# Patient Record
Sex: FEMALE | Race: WHITE | NOT HISPANIC OR LATINO | Employment: OTHER | URBAN - METROPOLITAN AREA
[De-identification: names, ages, dates, MRNs, and addresses within clinical notes are randomized per-mention and may not be internally consistent; named-entity substitution may affect disease eponyms.]

---

## 2018-04-10 ENCOUNTER — TELEPHONE (OUTPATIENT)
Dept: INTERNAL MEDICINE | Facility: CLINIC | Age: 63
End: 2018-04-10

## 2018-04-11 NOTE — TELEPHONE ENCOUNTER
Called patient and left a message. Recommend new shingles vaccine which is not live. It is more effective than the older vaccine and is not live.  I am not clear from the message if this is info she is looking for . I do not have documentation that the older vaccine was given.  JH

## 2018-04-12 ENCOUNTER — TELEPHONE (OUTPATIENT)
Dept: INTERNAL MEDICINE | Facility: CLINIC | Age: 63
End: 2018-04-12

## 2018-04-12 NOTE — TELEPHONE ENCOUNTER
Called patient .Jacinta Patricia was the oncologist incvolved in patients care in 1998 when she underwent a BMT. Patient.  requests I call them to discuss the new shingles vaccine and ask why they recommend the old vaccine and not the new one.  I will look up info and place call.

## 2018-04-12 NOTE — TELEPHONE ENCOUNTER
Spoke with Dr. HERNANDEZ's office. They do recommend Shingrix at this time for patients with h/o BMT, not a live vaccine like zostavax. Called patient and left a message on her VM.  ARLEY

## 2018-11-05 ENCOUNTER — TELEPHONE (OUTPATIENT)
Dept: INTERNAL MEDICINE | Facility: CLINIC | Age: 63
End: 2018-11-05

## 2018-11-05 NOTE — TELEPHONE ENCOUNTER
Pt is asking for a return call from Dr Park  In regards to a question about the Shingles injection

## 2018-11-05 NOTE — TELEPHONE ENCOUNTER
called patient and left a detail message regarding her vaccine. Asked her to check with her insurance company re:coverage.Explained it is not live, and it is a series of 2 shots  by 2-6 months.   ARLEY

## 2019-01-17 RX ORDER — VIT C/E/ZN/COPPR/LUTEIN/ZEAXAN 250MG-90MG
1 CAPSULE ORAL DAILY
COMMUNITY
End: 2022-08-25

## 2019-01-17 RX ORDER — AMITRIPTYLINE HYDROCHLORIDE 25 MG/1
25 TABLET, FILM COATED ORAL NIGHTLY
COMMUNITY
Start: 2017-03-27 | End: 2019-01-21 | Stop reason: SDUPTHER

## 2019-01-21 ENCOUNTER — TELEPHONE (OUTPATIENT)
Dept: GYNECOLOGY | Facility: CLINIC | Age: 64
End: 2019-01-21

## 2019-01-21 RX ORDER — AMITRIPTYLINE HYDROCHLORIDE 25 MG/1
25 TABLET, FILM COATED ORAL NIGHTLY
Qty: 90 TABLET | Refills: 2 | Status: SHIPPED | OUTPATIENT
Start: 2019-01-21 | End: 2019-10-23 | Stop reason: SDUPTHER

## 2019-01-23 ENCOUNTER — OFFICE VISIT (OUTPATIENT)
Dept: INTERNAL MEDICINE | Facility: CLINIC | Age: 64
End: 2019-01-23
Payer: MEDICARE

## 2019-01-23 VITALS
HEART RATE: 76 BPM | TEMPERATURE: 97.9 F | BODY MASS INDEX: 22.16 KG/M2 | WEIGHT: 146.2 LBS | HEIGHT: 68 IN | RESPIRATION RATE: 12 BRPM | SYSTOLIC BLOOD PRESSURE: 138 MMHG | OXYGEN SATURATION: 97 % | DIASTOLIC BLOOD PRESSURE: 80 MMHG

## 2019-01-23 DIAGNOSIS — Z11.59 NEED FOR HEPATITIS C SCREENING TEST: ICD-10-CM

## 2019-01-23 DIAGNOSIS — Z23 NEED FOR VACCINATION WITH 13-POLYVALENT PNEUMOCOCCAL CONJUGATE VACCINE: ICD-10-CM

## 2019-01-23 DIAGNOSIS — Z78.0 ASYMPTOMATIC POSTMENOPAUSAL ESTROGEN DEFICIENCY: ICD-10-CM

## 2019-01-23 DIAGNOSIS — Z00.00 INITIAL MEDICARE ANNUAL WELLNESS VISIT: Primary | ICD-10-CM

## 2019-01-23 DIAGNOSIS — Z12.39 BREAST SCREENING: ICD-10-CM

## 2019-01-23 DIAGNOSIS — Z13.220 LIPID SCREENING: ICD-10-CM

## 2019-01-23 LAB
CHOLEST SERPL-MCNC: 195 MG/DL
HDLC SERPL-MCNC: 52 MG/DL
HDLC SERPL: 3.8 {RATIO}
LDLC SERPL CALC-MCNC: 116 MG/DL
NONHDLC SERPL-MCNC: 143 MG/DL
TRIGL SERPL-MCNC: 136 MG/DL (ref 30–149)

## 2019-01-23 PROCEDURE — 80061 LIPID PANEL: CPT | Mod: GZ | Performed by: INTERNAL MEDICINE

## 2019-01-23 PROCEDURE — 200200 PR NO CHARGE: Performed by: INTERNAL MEDICINE

## 2019-01-23 PROCEDURE — 36415 COLL VENOUS BLD VENIPUNCTURE: CPT | Performed by: INTERNAL MEDICINE

## 2019-01-23 PROCEDURE — G0403 EKG FOR INITIAL PREVENT EXAM: HCPCS | Performed by: INTERNAL MEDICINE

## 2019-01-23 PROCEDURE — G0009 ADMIN PNEUMOCOCCAL VACCINE: HCPCS | Performed by: INTERNAL MEDICINE

## 2019-01-23 PROCEDURE — 86803 HEPATITIS C AB TEST: CPT | Performed by: INTERNAL MEDICINE

## 2019-01-23 PROCEDURE — 90670 PCV13 VACCINE IM: CPT | Performed by: INTERNAL MEDICINE

## 2019-01-23 PROCEDURE — G0402 INITIAL PREVENTIVE EXAM: HCPCS | Performed by: INTERNAL MEDICINE

## 2019-01-23 ASSESSMENT — VISUAL ACUITY
OD_CC: 20/50
OS_CC: 20/50

## 2019-01-23 ASSESSMENT — MINI COG
TOTAL SCORE: 5
COMPLETED: YES

## 2019-01-23 NOTE — ASSESSMENT & PLAN NOTE
You received prevnar 13 today. Next year I will give you a pneumovax.  I also recommend shingrix at your local pharmacy, this is 2 doses 2-6 months part.

## 2019-01-23 NOTE — PROGRESS NOTES
"        Subjective     Melisa Wu is a 63 y.o. female who presents for an initial preventive physical exam.     Comprehensive Medical and Social History  There is no problem list on file for this patient.    Past Medical History:   Diagnosis Date   • AML (acute myeloblastic leukemia) (CMS/HCC) (HCC) 1998   • B12 deficiency    • Colon polyp 11/2017   • Insomnia    • Leukopenia    • Squamous cell carcinoma     Right arm   • Thyroid nodule 07/2013    2mm nodule     Past Surgical History:   Procedure Laterality Date   • BONE MARROW TRANSPLANT  1998     No Known Allergies  Current Outpatient Prescriptions   Medication Sig Dispense Refill   • amitriptyline (ELAVIL) 25 mg tablet Take 1 tablet (25 mg total) by mouth nightly. 90 tablet 2   • ascorbate calcium (VITAMIN C ORAL) Take 1 tablet by mouth daily.     • BIOTIN ORAL Take 1 tablet by mouth daily.     • cholecalciferol, vitamin D3, (VITAMIN D3) 1,000 unit capsule Take 1 capsule by mouth daily.       No current facility-administered medications for this visit.      Social History     Social History   • Marital status:      Spouse name: N/A   • Number of children: N/A   • Years of education: N/A     Social History Main Topics   • Smoking status: Former Smoker   • Smokeless tobacco: Never Used      Comment: quit 40+ years ago   • Alcohol use No   • Drug use: Unknown   • Sexual activity: Not on file     Other Topics Concern   • Not on file     Social History Narrative    Tobacco: Former smoker quit 40+ years ago, Alcohol: None, Occupation: Retired Exercise 3-4 times weekly        Objective   Vitals  Vitals:    01/23/19 1236   Pulse: 76   Resp: 12   Temp: 36.6 °C (97.9 °F)   TempSrc: Oral   SpO2: 97%   Weight: 66.3 kg (146 lb 3.2 oz)   Height: 1.727 m (5' 8\")     Body mass index is 22.23 kg/m².    Advanced Care Plan  Does patient have advance directive?: No                                     PHQ  Over the Past 2 Weeks, Have You Felt Down, Depressed or " Hopeless?: no  Over the Past 2 Weeks, Have You Felt Little Interest or Pleasure In Doing Things?: no                                          Mini Cog  Completed: Yes  Score: 5  Result: Negative    Get Up and Go  Result: Pass      STEADI Falls Risk  One or more falls in the last year: No           Has trouble stepping up onto a curb: No   Advised to use a cane or walker to get around safely: No   Often has to rush to the toilet: No   Feels unsteady when walking: No   Has lost some feeling in feet: No   Often feels sad or depressed: No   Steadies self on furniture while walking at home: No   Takes medication that makes him/her feel lightheaded or more tired than usual: No   Worried about falling: No   Takes medicine to sleep or improve mood: No   Needs to push with hands when rising from a chair: No   Falls screen completed: Yes     Hearing and Vision Screening   Visual Acuity Screening    Right eye Left eye Both eyes   Without correction:      With correction: 20/50 20/50 20/30         Assessment/Plan   Problem List Items Addressed This Visit     None              See Patient Instructions (the written plan) which was given to the patient for PPPS and health risk factors with interventions.

## 2019-01-23 NOTE — ASSESSMENT & PLAN NOTE
Please exercise at least 45 minutes 4 days a week.  Walking briskly is a great source of exercise.  The goal is for 150 minutes of aerobic exercise each week and 20 minutes of light weights twice a week.  Please receive a flu shot annually, ideally in October or November. See the dentist twice a year for routine cleaning.  Follow a low-cholesterol diet to lessen the risk of  heart disease.   Consume calcium and vitamin D in your diet as outlined by the sheet that I have given to you.  If necessary, supplement with vitamin D3 1000 international units 4-5 days a week especially  in the winter months.  Limit alcohol beverages to 5-7 a week.    Check your Bp at home on occasion to help reasure yourself that BP is fine to lessen white coat hypertension when you are here.

## 2019-01-23 NOTE — PROGRESS NOTES
HISTORY OF PRESENT ILLNESS      Melisa Wu is a 63 y.o. year-old female.  Feeling fine. Reviewed medicare packet.  is POA and is a .   White coat HTN at times.  No complaints.  Discussed making a living will and does have a POA.       PAST MEDICAL AND SURGICAL HISTORY      PMHx:  Past Medical History:   Diagnosis Date   • AML (acute myeloblastic leukemia) (CMS/HCC) (HCC) 1998   • B12 deficiency    • Colon polyp 11/2017   • Insomnia    • Leukopenia    • Squamous cell carcinoma     Right arm   • Thyroid nodule 07/2013    2mm nodule       PSHx:  Past Surgical History:   Procedure Laterality Date   • BONE MARROW TRANSPLANT  1998       MEDICATIONS        Current Outpatient Prescriptions:   •  amitriptyline (ELAVIL) 25 mg tablet, Take 1 tablet (25 mg total) by mouth nightly., Disp: 90 tablet, Rfl: 2  •  ascorbate calcium (VITAMIN C ORAL), Take 1 tablet by mouth daily., Disp: , Rfl:   •  BIOTIN ORAL, Take 1 tablet by mouth daily., Disp: , Rfl:   •  cholecalciferol, vitamin D3, (VITAMIN D3) 1,000 unit capsule, Take 1 capsule by mouth daily., Disp: , Rfl:     ALLERGIES      Patient has no known allergies.    FAMILY HISTORY      Family History   Problem Relation Age of Onset   • Other Father         Vascular   • Breast cancer Maternal Grandmother    • Cancer Maternal Grandfather    • Colon cancer Paternal Grandmother    • Prostate cancer Paternal Grandfather        SOCIAL HISTORY      Social History     Social History   • Marital status:      Spouse name: N/A   • Number of children: N/A   • Years of education: N/A     Social History Main Topics   • Smoking status: Former Smoker   • Smokeless tobacco: Never Used      Comment: quit 40+ years ago   • Alcohol use No   • Drug use: Unknown   • Sexual activity: Not on file     Other Topics Concern   • Not on file     Social History Narrative    Tobacco: Former smoker quit 40+ years ago, Alcohol: None, Occupation: Retired Exercise 3-4 times weekly   "      REVIEW OF SYSTEMS      Review of Systems   All other systems reviewed and are negative.      PHYSICAL EXAMINATION      /80   Pulse 76   Temp 36.6 °C (97.9 °F) (Oral)   Resp 12   Ht 1.727 m (5' 8\")   Wt 66.3 kg (146 lb 3.2 oz)   SpO2 97%   BMI 22.23 kg/m²   Body mass index is 22.23 kg/m².    Physical Exam   Constitutional: She is oriented to person, place, and time. She appears well-developed and well-nourished.   HENT:   Head: Normocephalic and atraumatic.   Eyes: Conjunctivae and EOM are normal. Pupils are equal, round, and reactive to light.   Neck: Normal range of motion. Neck supple. No JVD present. No tracheal deviation present. No thyromegaly present.   Cardiovascular: Normal rate, regular rhythm, normal heart sounds and intact distal pulses.    Pulmonary/Chest: Effort normal and breath sounds normal. No respiratory distress. She has no wheezes.   Abdominal: Soft. Bowel sounds are normal. She exhibits no distension. There is no tenderness.   Musculoskeletal: Normal range of motion. She exhibits no edema.   Neurological: She is alert and oriented to person, place, and time. No cranial nerve deficit.   Skin: Skin is warm and dry.   Psychiatric: She has a normal mood and affect. Her behavior is normal. Judgment and thought content normal.   Vitals reviewed.           ASSESSMENT AND PLAN   Problem List Items Addressed This Visit     Initial Medicare annual wellness visit - Primary    Relevant Orders    ECG 12 LEAD OFFICE PERFORMED    Lipid panel    Asymptomatic postmenopausal estrogen deficiency    Relevant Orders    DEXA BONE DENSITY    Lipid screening    Relevant Orders    Lipid panel    Breast screening    Relevant Orders    BI SCREENING MAMMOGRAM BILATERAL    Need for hepatitis C screening test    Relevant Orders    Hepatitis C antibody    Need for vaccination with 13-polyvalent pneumococcal conjugate vaccine             Written education and action steps suggested to the patient are " documented in After Visit Summary / Patient Instructions sections of this encounter.

## 2019-01-23 NOTE — PATIENT INSTRUCTIONS
Women's Personalized Prevention Plan Services (PPPS)      Preventive Services Checklist (Assumes Average Risk Unless Otherwise Noted)  Preventive Service Target Population and Frequency Last Done Date Due   Abd Aortic Aneurysm Screening Family history of AAA (covered once) N/A    Alcohol Misuse Screening As necessary for those at risk (covered annually) N/A    Breast Cancer Screening Mammogram every 1-2yrs 50-71yo; every 1-2yrs 35-48yo if patient desires after weighing potential harms and benefits (covered once 35-38yo, annually >=39yo) 01/25/17    Cholesterol Screening Both risk factors: 1) >=19yo and 2)  increased risk coronary artery disease (covered every 5 years) 10/18/16    Colorectal Cancer Screening >=49yo: Colonoscopy every 10 years, FIT annually or Cologuard every 3 years (up to 84yo for Cologuard) 11/29/17    Diabetes Screening Any 1 risk factor: hypertension, dyslipidemia, obesity, high glucose; or Any 2 risk factors: >=64 yo, overweight, family history diabetes, history gestational diabetes or delivery of infant >9lbs (covered every 6 months) 10/18/16    Glaucoma Screening Any 1 risk factor: 1) diabetes, 2) family history glaucoma, 3)  >=49yo, 4)  American >=66yo (covered annually) 2017    Hepatitis C Screening Any 1 risk factor: 1) born between 4432-9689, 2) blood transfusion before 1992, 3) current or past injection drug use (covered once for average risk, annually for high risk)     Lung Cancer Screening Low-dose chest CT if all 3 risk factors: 1) 55-78yo, 2) smoker or quit within last 15y, 3) >=30 pack years (covered annually) N/A    Osteoporosis Screening >=66yo (covered every 24 months)  <66yo if any 1 risk factor: 1) estrogen deficient and at risk for osteoporosis; 2) established osteoporosis on treatment; 3) x-rays show possible osteoporosis, osteopenia or vertebral fractures; 4) on steroid or planning to start; 5) primary hyperparathyroidism  "(covered as medically necessary)     Sexually Transmitted Diseases (STDs) As necessary chlamydia, gonorrhea, syphilis, hepatitis B (covered annually if not pregnant, more often in pregnancy)  HIV if any 1 risk factor present: 1) <16yo or >66yo and at increased risk, 2) 15-66yo and ask for it, or 3) pregnant (covered annually if not pregnant, up to 3x in pregnancy) N/A    Vaccine: Hepatitis B As necessary if medium or high risk (series covered once)     Vaccine: Influenza All patients without contraindications (covered annually.) Oct. 2018    Vaccine: Pneumococcal Pneumovax + Prevnar (both covered, >=11 months apart) Pneumovax 05/15/2015    Vaccine: Shingrix (Shingles) >= 51yo without contraindications             (2 doses, 2 months apart) Shingrix 12/28/18    Other Services               Women's Personalized Prevention Plan Services (PPPS)              Prints to S                                         Health Risk Factors and Interventions  \"x\" Indicates risk is associated with this patient Risk Factor Recommended Interventions     Obesity     Hypertension     Diabetes     Fall Risk     Tobacco Use     Other:        Problem List Items Addressed This Visit     Initial Medicare annual wellness visit - Primary    Current Assessment & Plan     Please exercise at least 45 minutes 4 days a week.  Walking briskly is a great source of exercise.  The goal is for 150 minutes of aerobic exercise each week and 20 minutes of light weights twice a week.  Please receive a flu shot annually, ideally in October or November. See the dentist twice a year for routine cleaning.  Follow a low-cholesterol diet to lessen the risk of  heart disease.   Consume calcium and vitamin D in your diet as outlined by the sheet that I have given to you.  If necessary, supplement with vitamin D3 1000 international units 4-5 days a week especially  in the winter months.  Limit alcohol beverages to 5-7 a week.    Check your Bp at home on occasion to " help reasure yourself that BP is fine to lessen white coat hypertension when you are here.          Relevant Orders    ECG 12 LEAD OFFICE PERFORMED    Lipid panel    Asymptomatic postmenopausal estrogen deficiency    Current Assessment & Plan     Schedule a dexa scan          Relevant Orders    DEXA BONE DENSITY    Lipid screening    Current Assessment & Plan     I will order a lipid panel today since you had a light breakfast and not eaten for 2 hours.          Relevant Orders    Lipid panel    Breast screening    Current Assessment & Plan     Schedule a mammogram          Relevant Orders    BI SCREENING MAMMOGRAM BILATERAL    Need for hepatitis C screening test    Current Assessment & Plan     I will screen for hep c today.          Relevant Orders    Hepatitis C antibody    Need for vaccination with 13-polyvalent pneumococcal conjugate vaccine    Current Assessment & Plan     You received prevnar 13 today. Next year I will give you a pneumovax.  I also recommend shingrix at your local pharmacy, this is 2 doses 2-6 months part.

## 2019-01-24 LAB — HCV AB SER QL: NONREACTIVE

## 2019-06-04 ENCOUNTER — OFFICE VISIT (OUTPATIENT)
Dept: GYNECOLOGY | Facility: CLINIC | Age: 64
End: 2019-06-04
Payer: MEDICARE

## 2019-06-04 VITALS
WEIGHT: 143.2 LBS | BODY MASS INDEX: 21.7 KG/M2 | HEIGHT: 68 IN | DIASTOLIC BLOOD PRESSURE: 98 MMHG | SYSTOLIC BLOOD PRESSURE: 140 MMHG

## 2019-06-04 DIAGNOSIS — Z01.419 ENCOUNTER FOR GYNECOLOGICAL EXAMINATION WITHOUT ABNORMAL FINDING: Primary | ICD-10-CM

## 2019-06-04 DIAGNOSIS — Z87.59 HISTORY OF GESTATIONAL HYPERTENSION: ICD-10-CM

## 2019-06-04 DIAGNOSIS — Z80.3 FAMILY HISTORY OF BREAST CANCER: ICD-10-CM

## 2019-06-04 PROCEDURE — G0101 CA SCREEN;PELVIC/BREAST EXAM: HCPCS | Performed by: OBSTETRICS & GYNECOLOGY

## 2019-06-04 RX ORDER — PNV NO.95/FERROUS FUM/FOLIC AC 28MG-0.8MG
100 TABLET ORAL DAILY
COMMUNITY
End: 2024-06-06

## 2019-06-04 NOTE — PROGRESS NOTES
"Chief Complaint:  Annual    No complaints.  Nl mammo 2019  DEXA osteopenia low frax 2019  Pap neg/neg 2015  Rare sexual activity, same male partner.  Vulvodynia controlled with elavil    HPI:  2019      Melisa Wu is a 63 y.o. female who presents for annual exam. The patient has no complaints today. The patient is sexually active. GYN screening history: last pap: was normal. The patient has never been taking hormone replacement therapy. Patient denies post-menopausal vaginal bleeding.. The patient participates in regular exercise: yes.  The patient reports that there is not domestic violence in her life.    History of abnormal Pap smear: no  Family history of uterine or ovarian cancer: no  History of abnormal mammogram: no  Family history of breast cancer: yes - mat gm, mat aunt    OB History: Menstrual History:  OB History      Para Term  AB Living    1 1 1     1    SAB TAB Ectopic Multiple Live Births                     Obstetric Comments    + hbp, \"borderline\" diabetes         No LMP recorded. Patient is postmenopausal.         Medical History:   Past Medical History:   Diagnosis Date   • AML (acute myeloblastic leukemia) (CMS/HCC) (HCC)    • B12 deficiency    • Colon polyp 2017   • Insomnia    • Leukopenia    • Squamous cell carcinoma     Right arm   • Thyroid nodule 2013    2mm nodule       Surgical History:   Past Surgical History:   Procedure Laterality Date   • BONE MARROW TRANSPLANT         Social History:   Social History     Social History   • Marital status:      Spouse name: N/A   • Number of children: N/A   • Years of education: N/A     Social History Main Topics   • Smoking status: Former Smoker   • Smokeless tobacco: Never Used      Comment: quit 40+ years ago   • Alcohol use No   • Drug use: No   • Sexual activity: Yes     Partners: Male     Other Topics Concern   • None     Social History Narrative    Tobacco: Former smoker quit 40+ years ago, Alcohol: " "None, Occupation: Retired Exercise 3-4 times weekly        Family History:   Family History   Problem Relation Age of Onset   • Other Father         Vascular   • Breast cancer Maternal Grandmother    • Cancer Maternal Grandfather    • Colon cancer Paternal Grandmother    • Prostate cancer Paternal Grandfather        Current Medications:   Current Outpatient Prescriptions   Medication Sig Dispense Refill   • amitriptyline (ELAVIL) 25 mg tablet Take 1 tablet (25 mg total) by mouth nightly. 90 tablet 2   • ascorbate calcium (VITAMIN C ORAL) Take 1 tablet by mouth daily.     • BIOTIN ORAL Take 1 tablet by mouth daily.     • cholecalciferol, vitamin D3, (VITAMIN D3) 1,000 unit capsule Take 1 capsule by mouth daily.     • cyanocobalamin (vitamin B-12) 100 mcg tablet Take 100 mcg by mouth daily.       No current facility-administered medications for this visit.        Allergies: Patient has no known allergies.    Review of Systems  Constitutional: negative for weight loss  Gastrointestinal: negative for abdominal pain, oncont  Genitourinary:negative for urinary incontinence  Reproductive:no vaginal bleeding  Integument/breast: negative for breast lump, breast tenderness and nipple discharge  Musculoskeletal:negative  Neurological: negative  Behavioral/Psych: negative for anxiety and depression  Endocrine: negative    Physical Exam  BP (!) 140/98   Ht 1.727 m (5' 8\")   Wt 65 kg (143 lb 3.2 oz)   BMI 21.77 kg/m²      General Appearance: Alert, cooperative, no acute distress  Head: Normocephalic, without obvious abnormality, atraumatic    Neck: no adenopathy  Nodes: no enlargement of axillary or supraclavicular nodes  Thyroid: no enlargement/tenderness/nodules  Lungs: Clear to auscultation bilaterally, respirations unlabored  Heart: Regular rate and rhythm, S1 and S2 normal, no murmur, rub or gallop  Breast: no masses, nontender and no discharge  Abdomen: Soft, nontender, nondistended,   no masses, no organomegaly  Back: " kyphosisNo   Pelvic:     Vulva normal, Bartholin's, Urethra, Camano's normal  Vagina pale  Cervixnulliparous appearance  Uterusnormal size, anteverted, mobile  Adnexa normal adnexa and no mass, fullness, tenderness  Rectal: mass No   Extremities: wnl  Musculoskeletal:wnl  Skin: Skin color, texture, turgor normal, no rashes or lesions    Neurologic:oriented and  memory intact  Psych:normal affect and behavior appropriate    Assessment & Plan    Problem List Items Addressed This Visit     Family history of breast cancer    Overview     Mat aunt and mat gm. unknown if they had any mutations.    Needs to have tyrer audrey 8 done was less than 20 uin old model    With new tyrer risk is 13%         History of gestational hypertension    Overview     Risk for early cvd  To see cardiology           Other Visit Diagnoses     Encounter for gynecological examination without abnormal finding    -  Primary          Return in about 1 year (around 6/4/2020).  Fabiana Seay MD

## 2019-06-06 ENCOUNTER — TELEPHONE (OUTPATIENT)
Dept: SCHEDULING | Facility: CLINIC | Age: 64
End: 2019-06-06

## 2019-06-06 NOTE — TELEPHONE ENCOUNTER
Patient has appnt 7/18 for new pat appnt. If there is anything sooner,please call patient at [phone]. ty

## 2019-06-18 ENCOUNTER — OFFICE VISIT (OUTPATIENT)
Dept: CARDIOLOGY | Facility: CLINIC | Age: 64
End: 2019-06-18
Payer: MEDICARE

## 2019-06-18 VITALS
WEIGHT: 146.9 LBS | DIASTOLIC BLOOD PRESSURE: 90 MMHG | OXYGEN SATURATION: 96 % | BODY MASS INDEX: 22.26 KG/M2 | HEART RATE: 90 BPM | SYSTOLIC BLOOD PRESSURE: 140 MMHG | HEIGHT: 68 IN

## 2019-06-18 DIAGNOSIS — R03.0 ELEVATED BLOOD PRESSURE READING WITHOUT DIAGNOSIS OF HYPERTENSION: ICD-10-CM

## 2019-06-18 DIAGNOSIS — Z91.89 CARDIOVASCULAR RISK FACTOR: Primary | ICD-10-CM

## 2019-06-18 PROCEDURE — 99204 OFFICE O/P NEW MOD 45 MIN: CPT | Performed by: INTERNAL MEDICINE

## 2019-06-18 NOTE — LETTER
"2019     Rocky Park MD  [address]  4th Floor  [facility] [address]    Patient: Melisa Wu  YOB: 1955  Date of Visit: 2019      Dear Dr. Park:    Thank you for referring Melisa Wu to me for evaluation. Below are my notes for this consultation.    If you have questions, please do not hesitate to call me. I look forward to following your patient along with you.         Sincerely,        Saritha Nick MD        CC: No Recipients  Saritha Nick MD  2019  6:37 AM  Sign at close encounter   Saritha Nick MD, [facility]  Cardiology    [facility]  The [facility] Level  [address] [place] [address]    TEL  [phone]  [facility]/ml     2019    Reason for visit: Cardiovascular Consultation.    Melisa Wu is a 63 y.o. female who presents for cardiovascular consultation.  History is obtained from the patient and extensive review of all available medical records.    Melisa has a notable pregnancy history, complicated by gestational hypertension and \"borderline diabetes.\" Blood pressure is elevated on exam today and Melisa reports it has been at periodic physician visits.  On review of all available medical records, blood pressure has been mildly elevated measuring 140/98 mmHg on 2019, 138/80 mmHg on 2019.    She also had premature menopause, completing around age 43.    Kelin is without cardiovascular symptom or concern.  She reports regular exercise, participating in yoga and marianela chi as well as weight training regularly without cardia vascular symptom or limitation.  She denies chest pain, shortness of breath, orthopnea, PND, palpitations, dizziness, lightheadedness, or syncope.    Past Medical History:  1.  Acute myoblastic leukemia:   2.  History of gestational hypertension  3.  Premature menopause  4.  Obstetric history:   5.  Leukopenia  6.  " Insomnia    Past Surgical History:  1.  Bone marrow transplant: 1998  2.  Port placement: 1998    Medications: Amitriptyline 25 mg nightly, vitamin B12 100 mcg daily, vitamin D 1000 units daily, biotin, vitamin C.    Allergies: Patient has no known allergies.    Social History: , Juan Alberto. Former smoker, quit over 40 years ago.  Denies alcohol or illicit drug use.    Family History: Reviewed and noncontributory.    Review of Systems   Constitution: Negative for malaise/fatigue and weight gain.   HENT: Negative for nosebleeds.    Eyes: Negative for vision loss in left eye, vision loss in right eye and visual disturbance.   Cardiovascular: Negative for chest pain, cyanosis, dyspnea on exertion, irregular heartbeat, leg swelling, near-syncope, orthopnea, palpitations, paroxysmal nocturnal dyspnea and syncope.   Respiratory: Negative for cough, shortness of breath, snoring and wheezing.    Endocrine: Negative for polyuria.   Hematologic/Lymphatic: Negative for bleeding problem. Does not bruise/bleed easily.   Skin: Negative for rash.   Musculoskeletal: Negative for arthritis and myalgias.   Gastrointestinal: Negative for abdominal pain, hematochezia, melena, nausea and vomiting.   Genitourinary: Negative for hematuria.   Neurological: Negative for dizziness and light-headedness.   Psychiatric/Behavioral: Negative for altered mental status and depression.       Exam:  Objective   Vitals:    06/18/19 1117   BP: 140/90   Pulse: 90   SpO2: 96%     Body mass index is 22.34 kg/m².  Physical Exam   Constitutional: She appears well-developed and well-nourished. No distress.   HENT:   Head: Normocephalic.   Mouth/Throat: Mucous membranes are normal. Mucous membranes are not cyanotic.   Eyes: Conjunctivae are normal.   Neck: No JVD present. Carotid bruit is not present.   Cardiovascular: Normal rate, regular rhythm, S1 normal and S2 normal.   No extrasystoles are present. Exam reveals no gallop.    No murmur  heard.  Pulses:       Carotid pulses are 2+ on the right side, and 2+ on the left side.       Radial pulses are 2+ on the right side, and 2+ on the left side.   Pulmonary/Chest: Effort normal. No respiratory distress. She has no wheezes. She has no rales.   Abdominal: Soft. Bowel sounds are normal. There is no tenderness.   Musculoskeletal: She exhibits no edema.   Lymphadenopathy:     She has no cervical adenopathy.   Neurological: She is alert.   Skin: Skin is warm and dry. No cyanosis.   Psychiatric: She has a normal mood and affect. Her speech is normal.       Labs:  Lab Results   Component Value Date    CHOL 195 01/23/2019    TRIG 136 01/23/2019    HDL 52 (L) 01/23/2019     Personally reviewed, my interpretation: .    Cardiovascular Studies:   1.  Echocardiogram 12/1/2016 (personally reviewed): Normal left ventricular cavity size and wall thickness with preserved systolic function, EF 60%.  No regional wall motion abnormalities.  False tendon.  Normal right ventricular size with preserved systolic function.  Normal biatrial size.  Mitral leaflets are mildly thickened.  Mild posterior annular calcification.  Mild mitral regurgitation.  Trileaflet aortic valve.  No aortic stenosis or regurgitation.  Mild tricuspid regurgitation.  Estimated RVSP 18 mmHg.    ECG from 1/23/19 personally reviewed and discussed with the patient shows sinus rhythm, normal tracing.      Assessment/Plan   Problem List Items Addressed This Visit     Cardiovascular risk factor - Primary     Melisa has a history of gestational hypertension and premature menopause.  Discussed increased risk of hypertension and cardiovascular disease in women with hypertensive complications of pregnancy as well as premature menopause.   Recent lipids from 1/23/2019 reviewed and show acceptable levels with LDL measuring 116.  --Blood pressure management as below.  --Lifestyle modification discussed.  She maintains a healthy weights with heart healthy  diet.  I have recommended increasing regular aerobic activity.  --Consider coronary calcium score.         Relevant Orders    ECG 12 LEAD-OFFICE PERFORMED    Elevated blood pressure reading without diagnosis of hypertension     Blood pressure elevated on exam today, measuring 140/90 mmHg in her left upper extremity and 138/90 mmHg in her right.  She reports whitecoat hypertension.  She does have a home blood pressure cuff, which she did bring today however unfortunately the power cord is missing.  She does not monitor home blood pressure regularly.  --Recommend she obtain a home blood pressure cuff and log readings 3-4 times per week.  Bring home blood pressure cuff to next visit.  --If home blood pressures are all well within normal limits and home blood pressure cuff correlates, we can hold on antihypertensive therapy.  Otherwise I would recommend initiation of antihypertensive therapy.  --Lifestyle modification discussed.  --Return in 4 weeks for blood pressure check.                  This letter was generated using speech recognition software. Please excuse any typographical errors.    Return in about 4 weeks (around 7/16/2019).        Saritha Nick MD, FACC

## 2019-06-18 NOTE — PROGRESS NOTES
" Saritha Nick MD, [facility]  Cardiology    [facility]    [facility]  [facility] [address]  [address] [place] [address]    TEL  [phone]  [facility]     2019    Reason for visit: Cardiovascular Consultation.    Melisa uW is a 63 y.o. female who presents for cardiovascular consultation.  History is obtained from the patient and extensive review of all available medical records.    Melisa has a notable pregnancy history, complicated by gestational hypertension and \"borderline diabetes.\" Blood pressure is elevated on exam today and Melisa reports it has been at periodic physician visits.  On review of all available medical records, blood pressure has been mildly elevated measuring 140/98 mmHg on 2019, 138/80 mmHg on 2019.    She also had premature menopause, completing around age 43.    Kelin is without cardiovascular symptom or concern.  She reports regular exercise, participating in yoga and marianela chi as well as weight training regularly without cardia vascular symptom or limitation.  She denies chest pain, shortness of breath, orthopnea, PND, palpitations, dizziness, lightheadedness, or syncope.    Past Medical History:  1.  Acute myoblastic leukemia:   2.  History of gestational hypertension  3.  Premature menopause  4.  Obstetric history:   5.  Leukopenia  6.  Insomnia    Past Surgical History:  1.  Bone marrow transplant:   2.  Port placement:     Medications: Amitriptyline 25 mg nightly, vitamin B12 100 mcg daily, vitamin D 1000 units daily, biotin, vitamin C.    Allergies: Patient has no known allergies.    Social History: , Juan Alberto. Former smoker, quit over 40 years ago.  Denies alcohol or illicit drug use.    Family History: Reviewed and noncontributory.    Review of Systems   Constitution: Negative for malaise/fatigue and weight gain.   HENT: Negative for nosebleeds.    Eyes: Negative " for vision loss in left eye, vision loss in right eye and visual disturbance.   Cardiovascular: Negative for chest pain, cyanosis, dyspnea on exertion, irregular heartbeat, leg swelling, near-syncope, orthopnea, palpitations, paroxysmal nocturnal dyspnea and syncope.   Respiratory: Negative for cough, shortness of breath, snoring and wheezing.    Endocrine: Negative for polyuria.   Hematologic/Lymphatic: Negative for bleeding problem. Does not bruise/bleed easily.   Skin: Negative for rash.   Musculoskeletal: Negative for arthritis and myalgias.   Gastrointestinal: Negative for abdominal pain, hematochezia, melena, nausea and vomiting.   Genitourinary: Negative for hematuria.   Neurological: Negative for dizziness and light-headedness.   Psychiatric/Behavioral: Negative for altered mental status and depression.       Exam:  Objective   Vitals:    06/18/19 1117   BP: 140/90   Pulse: 90   SpO2: 96%     Body mass index is 22.34 kg/m².  Physical Exam   Constitutional: She appears well-developed and well-nourished. No distress.   HENT:   Head: Normocephalic.   Mouth/Throat: Mucous membranes are normal. Mucous membranes are not cyanotic.   Eyes: Conjunctivae are normal.   Neck: No JVD present. Carotid bruit is not present.   Cardiovascular: Normal rate, regular rhythm, S1 normal and S2 normal.   No extrasystoles are present. Exam reveals no gallop.    No murmur heard.  Pulses:       Carotid pulses are 2+ on the right side, and 2+ on the left side.       Radial pulses are 2+ on the right side, and 2+ on the left side.   Pulmonary/Chest: Effort normal. No respiratory distress. She has no wheezes. She has no rales.   Abdominal: Soft. Bowel sounds are normal. There is no tenderness.   Musculoskeletal: She exhibits no edema.   Lymphadenopathy:     She has no cervical adenopathy.   Neurological: She is alert.   Skin: Skin is warm and dry. No cyanosis.   Psychiatric: She has a normal mood and affect. Her speech is normal.        Labs:  Lab Results   Component Value Date    CHOL 195 01/23/2019    TRIG 136 01/23/2019    HDL 52 (L) 01/23/2019     Personally reviewed, my interpretation: .    Cardiovascular Studies:   1.  Echocardiogram 12/1/2016 (personally reviewed): Normal left ventricular cavity size and wall thickness with preserved systolic function, EF 60%.  No regional wall motion abnormalities.  False tendon.  Normal right ventricular size with preserved systolic function.  Normal biatrial size.  Mitral leaflets are mildly thickened.  Mild posterior annular calcification.  Mild mitral regurgitation.  Trileaflet aortic valve.  No aortic stenosis or regurgitation.  Mild tricuspid regurgitation.  Estimated RVSP 18 mmHg.    ECG from 1/23/19 personally reviewed and discussed with the patient shows sinus rhythm, normal tracing.      Assessment/Plan   Problem List Items Addressed This Visit     Cardiovascular risk factor - Primary     Melisa has a history of gestational hypertension and premature menopause.  Discussed increased risk of hypertension and cardiovascular disease in women with hypertensive complications of pregnancy as well as premature menopause.   Recent lipids from 1/23/2019 reviewed and show acceptable levels with LDL measuring 116.  --Blood pressure management as below.  --Lifestyle modification discussed.  She maintains a healthy weights with heart healthy diet.  I have recommended increasing regular aerobic activity.  --Consider coronary calcium score.         Relevant Orders    ECG 12 LEAD-OFFICE PERFORMED    Elevated blood pressure reading without diagnosis of hypertension     Blood pressure elevated on exam today, measuring 140/90 mmHg in her left upper extremity and 138/90 mmHg in her right.  She reports whitecoat hypertension.  She does have a home blood pressure cuff, which she did bring today however unfortunately the power cord is missing.  She does not monitor home blood pressure regularly.  --Recommend  she obtain a home blood pressure cuff and log readings 3-4 times per week.  Bring home blood pressure cuff to next visit.  --If home blood pressures are all well within normal limits and home blood pressure cuff correlates, we can hold on antihypertensive therapy.  Otherwise I would recommend initiation of antihypertensive therapy.  --Lifestyle modification discussed.  --Return in 4 weeks for blood pressure check.                  This letter was generated using speech recognition software. Please excuse any typographical errors.    Return in about 4 weeks (around 7/16/2019).        Saritha Nick MD, FACC

## 2019-06-19 ASSESSMENT — ENCOUNTER SYMPTOMS
ALTERED MENTAL STATUS: 0
SYNCOPE: 0
DEPRESSION: 0
LIGHT-HEADEDNESS: 0
RIGHT EYE: 0
DIZZINESS: 0
IRREGULAR HEARTBEAT: 0
HEMATOCHEZIA: 0
PND: 0
WHEEZING: 0
NEAR-SYNCOPE: 0
SHORTNESS OF BREATH: 0
HEMATURIA: 0
VOMITING: 0
SNORING: 0
LEFT EYE: 0
BRUISES/BLEEDS EASILY: 0
WEIGHT GAIN: 0
DYSPNEA ON EXERTION: 0
COUGH: 0
ABDOMINAL PAIN: 0
ORTHOPNEA: 0
NAUSEA: 0
MYALGIAS: 0
PALPITATIONS: 0

## 2019-06-19 NOTE — ASSESSMENT & PLAN NOTE
Melisa has a history of gestational hypertension and premature menopause.  Discussed increased risk of hypertension and cardiovascular disease in women with hypertensive complications of pregnancy as well as premature menopause.   Recent lipids from 1/23/2019 reviewed and show acceptable levels with LDL measuring 116.  --Blood pressure management as below.  --Lifestyle modification discussed.  She maintains a healthy weights with heart healthy diet.  I have recommended increasing regular aerobic activity.  --Consider coronary calcium score.

## 2019-06-19 NOTE — ASSESSMENT & PLAN NOTE
Blood pressure elevated on exam today, measuring 140/90 mmHg in her left upper extremity and 138/90 mmHg in her right.  She reports whitecoat hypertension.  She does have a home blood pressure cuff, which she did bring today however unfortunately the power cord is missing.  She does not monitor home blood pressure regularly.  --Recommend she obtain a home blood pressure cuff and log readings 3-4 times per week.  Bring home blood pressure cuff to next visit.  --If home blood pressures are all well within normal limits and home blood pressure cuff correlates, we can hold on antihypertensive therapy.  Otherwise I would recommend initiation of antihypertensive therapy.  --Lifestyle modification discussed.  --Return in 4 weeks for blood pressure check.

## 2019-07-16 ENCOUNTER — TELEPHONE (OUTPATIENT)
Dept: SCHEDULING | Facility: CLINIC | Age: 64
End: 2019-07-16

## 2019-07-16 NOTE — TELEPHONE ENCOUNTER
Pt needs to cancel appt on Friday and needs to reschedule.  Next appt is not until Sept, pt says she needs to see her sooner.  Pt can be reached at [phone].

## 2019-09-06 ENCOUNTER — OFFICE VISIT (OUTPATIENT)
Dept: CARDIOLOGY | Facility: CLINIC | Age: 64
End: 2019-09-06
Payer: MEDICARE

## 2019-09-06 VITALS
RESPIRATION RATE: 16 BRPM | OXYGEN SATURATION: 98 % | HEIGHT: 68 IN | WEIGHT: 148.3 LBS | SYSTOLIC BLOOD PRESSURE: 148 MMHG | HEART RATE: 76 BPM | BODY MASS INDEX: 22.48 KG/M2 | DIASTOLIC BLOOD PRESSURE: 94 MMHG

## 2019-09-06 DIAGNOSIS — I10 ESSENTIAL HYPERTENSION: ICD-10-CM

## 2019-09-06 DIAGNOSIS — Z87.59 HISTORY OF GESTATIONAL HYPERTENSION: ICD-10-CM

## 2019-09-06 DIAGNOSIS — Z91.89 CARDIOVASCULAR RISK FACTOR: Primary | ICD-10-CM

## 2019-09-06 PROCEDURE — 99213 OFFICE O/P EST LOW 20 MIN: CPT | Performed by: INTERNAL MEDICINE

## 2019-09-06 RX ORDER — AMLODIPINE BESYLATE 2.5 MG/1
2.5 TABLET ORAL DAILY
Qty: 90 TABLET | Refills: 1 | Status: SHIPPED | OUTPATIENT
Start: 2019-09-06 | End: 2019-11-19 | Stop reason: SDUPTHER

## 2019-09-06 ASSESSMENT — ENCOUNTER SYMPTOMS
MYALGIAS: 0
SHORTNESS OF BREATH: 0
ALTERED MENTAL STATUS: 0
RIGHT EYE: 0
PND: 0
VOMITING: 0
COUGH: 0
DYSPNEA ON EXERTION: 0
DIZZINESS: 0
SYNCOPE: 0
BRUISES/BLEEDS EASILY: 0
NEAR-SYNCOPE: 0
WEIGHT GAIN: 0
ABDOMINAL PAIN: 0
PALPITATIONS: 0
WHEEZING: 0
NAUSEA: 0
LEFT EYE: 0
SNORING: 0
DEPRESSION: 0
IRREGULAR HEARTBEAT: 0
HEMATOCHEZIA: 0
ORTHOPNEA: 0
HEMATURIA: 0
LIGHT-HEADEDNESS: 0

## 2019-09-06 NOTE — LETTER
September 7, 2019                                                                                                                                                                                                                                                                                                                   Patient: Melisa Wu   YOB: 1955   Date of Visit: 9/6/2019       Dear Dr. Park:    Thank you for the opportunity of seeing your patient, Melisa Wu, today, 9/6/2019. Following is a summary of today's visit and my recommendation(s).    Thank you for allowing us to participate in Melisa Hilario care.  Please feel free to contact me with any questions.      Assessment / Plan:  Problem List Items Addressed This Visit     History of gestational hypertension     Noted.  Now likely chronic hypertension as below.  --Blood pressure management as below.         Cardiovascular risk factor - Primary     Melisa has a history of gestational hypertension, now hypertension, former tobacco use (although quit over 40 years ago), and premature menopause.  Discussed increased risk of cardiovascular disease in women with hypertensive complications of pregnancy as well as premature menopause.   Recent lipids from 1/23/2019 reviewed and show acceptable levels with LDL measuring 116.  --Blood pressure management as below.  --Lifestyle modification discussed.  She maintains a healthy weight with heart healthy diet.  I have recommended increasing regular aerobic activity.  --Recommend coronary calcium score.  Reviewed risks and benefits including out-of-pocket cost and the patient agrees to proceed.         Relevant Orders    CT HEART CORONARY CALCIUM SCORE    Essential hypertension     Blood pressure elevated on exam today, measuring 148/94 mmHg.  Blood pressure has been elevated now at three separate physician visits, consistent with underlying essential hypertension.  Home blood pressure cuff has  also show mildly elevated readings, however her cuff measures 162/102 mmHg following my reading which is much higher.  --Recommend initiation of antihypertensive therapy.  --Start amlodipine 2.5 mg daily.  --Goal blood pressure less than 130/80 mmHg.  --Lifestyle modification discussed, particularly regular exercise.  --Return in 4-6 weeks for blood pressure check.         Relevant Medications    amLODIPine (NORVASC) 2.5 mg tablet                 Sincerely,      Saritha Nick MD    CC: No Recipients

## 2019-09-06 NOTE — PROGRESS NOTES
Saritha Nick MD, [facility]  Cardiology    [facility]    [facility]  [facility] [address]  [address] [place] [address]    TEL  [phone]  [facility]     2019    Reason for visit: Cardiovascular follow-up.    Melisa Wu is a 63 y.o. female who presents for cardiovascular follow-up.  Kelin has a past medical history of gestational hypertension and premature menopause.  I met her in initial cardiovascular consultation on 2019 and preventative cardiovascular evaluation.  Blood pressure was elevated as it had been at several physician visits.  I recommend she monitor home blood pressure as she reported whitecoat hypertension and return for close follow-up.    Melisa states she has been intermittently monitoring home blood pressure which has ranged from 122-141/70-90 mmHg.  She brings her home blood pressure cuff today which measures much higher than my manual read at 162/102 mmHg.    Kelin is without cardiovascular symptom or concern.  She reports regular exercise, participating in yoga and marianela chi as well as weight training regularly without cardiovascular symptom or limitation.  She denies chest pain, shortness of breath, orthopnea, PND, palpitations, dizziness, lightheadedness, or syncope.    Past Medical History:  1.  Acute myoblastic leukemia:   2.  History of gestational hypertension  3.  Premature menopause  4.  Obstetric history:   5.  Leukopenia  6.  Insomnia    Past Surgical History:  1.  Bone marrow transplant:   2.  Port placement:     Medications: Amitriptyline 25 mg nightly, vitamin B12 100 mcg daily, vitamin D 1000 units daily, biotin, vitamin C.    Allergies: Patient has no known allergies.    Social History: , Juan Alberto. Former smoker, quit over 40 years ago.  Denies alcohol or illicit drug use.    Family History: Reviewed and noncontributory.    Review of Systems   Constitution:  Negative for malaise/fatigue and weight gain.   HENT: Negative for nosebleeds.    Eyes: Negative for vision loss in left eye, vision loss in right eye and visual disturbance.   Cardiovascular: Negative for chest pain, cyanosis, dyspnea on exertion, irregular heartbeat, leg swelling, near-syncope, orthopnea, palpitations, paroxysmal nocturnal dyspnea and syncope.   Respiratory: Negative for cough, shortness of breath, snoring and wheezing.    Endocrine: Negative for polyuria.   Hematologic/Lymphatic: Negative for bleeding problem. Does not bruise/bleed easily.   Skin: Negative for rash.   Musculoskeletal: Negative for arthritis and myalgias.   Gastrointestinal: Negative for abdominal pain, hematochezia, melena, nausea and vomiting.   Genitourinary: Negative for hematuria.   Neurological: Negative for dizziness and light-headedness.   Psychiatric/Behavioral: Negative for altered mental status and depression.       Exam:  Objective   Vitals:    09/06/19 1420   BP: (!) 148/94   Pulse: 76   Resp: 16   SpO2: 98%     Body mass index is 22.55 kg/m².  Physical Exam   Constitutional: She appears well-developed and well-nourished. No distress.   HENT:   Head: Normocephalic.   Mouth/Throat: Mucous membranes are normal. Mucous membranes are not cyanotic.   Eyes: Conjunctivae are normal.   Neck: No JVD present. Carotid bruit is not present.   Cardiovascular: Normal rate, regular rhythm, S1 normal and S2 normal.   No extrasystoles are present. Exam reveals no gallop.    No murmur heard.  Pulses:       Carotid pulses are 2+ on the right side, and 2+ on the left side.       Radial pulses are 2+ on the right side, and 2+ on the left side.   Pulmonary/Chest: Effort normal. No respiratory distress. She has no wheezes. She has no rales.   Abdominal: Soft. Bowel sounds are normal. There is no tenderness.   Musculoskeletal: She exhibits no edema.   Lymphadenopathy:     She has no cervical adenopathy.   Neurological: She is alert.   Skin:  Skin is warm and dry. No cyanosis.   Psychiatric: She has a normal mood and affect. Her speech is normal.       Labs:  Lab Results   Component Value Date    CHOL 195 01/23/2019    TRIG 136 01/23/2019    HDL 52 (L) 01/23/2019     Personally reviewed, my interpretation: .    Cardiovascular Studies:   1.  Echocardiogram 12/1/2016 (personally reviewed): Normal left ventricular cavity size and wall thickness with preserved systolic function, EF 60%.  No regional wall motion abnormalities.  False tendon.  Normal right ventricular size with preserved systolic function.  Normal biatrial size.  Mitral leaflets are mildly thickened.  Mild posterior annular calcification.  Mild mitral regurgitation.  Trileaflet aortic valve.  No aortic stenosis or regurgitation.  Mild tricuspid regurgitation.  Estimated RVSP 18 mmHg.      Assessment/Plan   Problem List Items Addressed This Visit     History of gestational hypertension     Noted.  Now likely chronic hypertension as below.  --Blood pressure management as below.         Cardiovascular risk factor - Primary     Melisa has a history of gestational hypertension, now hypertension, former tobacco use (although quit over 40 years ago), and premature menopause.  Discussed increased risk of cardiovascular disease in women with hypertensive complications of pregnancy as well as premature menopause.   Recent lipids from 1/23/2019 reviewed and show acceptable levels with LDL measuring 116.  --Blood pressure management as below.  --Lifestyle modification discussed.  She maintains a healthy weight with heart healthy diet.  I have recommended increasing regular aerobic activity.  --Recommend coronary calcium score.  Reviewed risks and benefits including out-of-pocket cost and the patient agrees to proceed.         Relevant Orders    CT HEART CORONARY CALCIUM SCORE    Essential hypertension     Blood pressure elevated on exam today, measuring 148/94 mmHg.  Blood pressure has been  elevated now at three separate physician visits, consistent with underlying essential hypertension.  Home blood pressure cuff has also show mildly elevated readings, however her cuff measures 162/102 mmHg following my reading which is much higher.  --Recommend initiation of antihypertensive therapy.  --Start amlodipine 2.5 mg daily.  --Goal blood pressure less than 130/80 mmHg.  --Lifestyle modification discussed, particularly regular exercise.  --Return in 4-6 weeks for blood pressure check.         Relevant Medications    amLODIPine (NORVASC) 2.5 mg tablet             This letter was generated using speech recognition software. Please excuse any typographical errors.    Return in about 6 weeks (around 10/18/2019).        Saritha Nick MD, FACC

## 2019-09-07 PROBLEM — I10 ESSENTIAL HYPERTENSION: Status: ACTIVE | Noted: 2019-06-18

## 2019-09-07 NOTE — ASSESSMENT & PLAN NOTE
Blood pressure elevated on exam today, measuring 148/94 mmHg.  Blood pressure has been elevated now at three separate physician visits, consistent with underlying essential hypertension.  Home blood pressure cuff has also show mildly elevated readings, however her cuff measures 162/102 mmHg following my reading which is much higher.  --Recommend initiation of antihypertensive therapy.  --Start amlodipine 2.5 mg daily.  --Goal blood pressure less than 130/80 mmHg.  --Lifestyle modification discussed, particularly regular exercise.  --Return in 4-6 weeks for blood pressure check.

## 2019-09-07 NOTE — ASSESSMENT & PLAN NOTE
Melisa has a history of gestational hypertension, now hypertension, former tobacco use (although quit over 40 years ago), and premature menopause.  Discussed increased risk of cardiovascular disease in women with hypertensive complications of pregnancy as well as premature menopause.   Recent lipids from 1/23/2019 reviewed and show acceptable levels with LDL measuring 116.  --Blood pressure management as below.  --Lifestyle modification discussed.  She maintains a healthy weight with heart healthy diet.  I have recommended increasing regular aerobic activity.  --Recommend coronary calcium score.  Reviewed risks and benefits including out-of-pocket cost and the patient agrees to proceed.

## 2019-09-09 ENCOUNTER — TELEPHONE (OUTPATIENT)
Dept: INTERNAL MEDICINE | Facility: CLINIC | Age: 64
End: 2019-09-09

## 2019-09-09 DIAGNOSIS — M54.2 NECK PAIN: Primary | ICD-10-CM

## 2019-09-09 NOTE — TELEPHONE ENCOUNTER
Pt called in stated she injured her neck and upper back and would like a script for PT, she stated this pain has been going on for three weeks and is not getting any better. If you have questions pt can be reached at [phone]

## 2019-09-12 ENCOUNTER — TELEPHONE (OUTPATIENT)
Dept: SCHEDULING | Facility: CLINIC | Age: 64
End: 2019-09-12

## 2019-09-13 NOTE — TELEPHONE ENCOUNTER
Pt requesting call back in regards to previous messages to reschedule appt with Dr. Nick.    Pt can be reached at cell: [phone]

## 2019-10-23 ENCOUNTER — TELEPHONE (OUTPATIENT)
Dept: GYNECOLOGY | Facility: CLINIC | Age: 64
End: 2019-10-23

## 2019-10-23 RX ORDER — AMITRIPTYLINE HYDROCHLORIDE 25 MG/1
25 TABLET, FILM COATED ORAL NIGHTLY
Qty: 90 TABLET | Refills: 2 | Status: SHIPPED | OUTPATIENT
Start: 2019-10-23 | End: 2020-04-08 | Stop reason: SDUPTHER

## 2019-10-31 ENCOUNTER — TELEPHONE (OUTPATIENT)
Dept: SCHEDULING | Facility: CLINIC | Age: 64
End: 2019-10-31

## 2019-11-18 ASSESSMENT — ENCOUNTER SYMPTOMS
IRREGULAR HEARTBEAT: 0
DIZZINESS: 0
WHEEZING: 0
HEMATURIA: 0
HEMATOCHEZIA: 0
DEPRESSION: 0
ALTERED MENTAL STATUS: 0
MYALGIAS: 0
PND: 0
COUGH: 0
RIGHT EYE: 0
SNORING: 0
WEIGHT GAIN: 0
PALPITATIONS: 0
LIGHT-HEADEDNESS: 0
BRUISES/BLEEDS EASILY: 0
ABDOMINAL PAIN: 0
NAUSEA: 0
SYNCOPE: 0
NEAR-SYNCOPE: 0
DYSPNEA ON EXERTION: 0
VOMITING: 0
LEFT EYE: 0
ORTHOPNEA: 0
SHORTNESS OF BREATH: 0

## 2019-11-18 NOTE — PROGRESS NOTES
"  Reason for visit: Cardiovascular follow-up.    Melisa Wu is a 64 y.o. female who presents for cardiovascular follow-up.  Kelin has a past medical history of gestational hypertension and premature menopause.  She was seen by Dr. Nick for initial cardiovascular consultation on 2019 and preventative cardiovascular evaluation.  Blood pressure was elevated as it had been at several physician visits and she reported significant \"white coat syndrome\".  She was asked to monitor Bp at home and reported home blood pressure was ranging 122-141/70-90 mmHg.  Her home BP cuff was reading much higher than Dr. Nick's measurements.  BP in the office was 148/94 and amlodipine 2.5 mg was initiated.  CT calcium score was ordered but I do not see it resulted.    Today Kelin reports she's feeling well. She is adhering to a primarily plant based diet.  She is working out 4x/week, doing weights once a week and lifting 155 lb x 8 reps on an upper body machine. She also does Blu Chi, Yoga, walking.    She is no longer checking her BP as it was making her anxious.  She's had no dizziness/lightheadedness/palpitations/edema/PND/orthopnea/chest pain.    She did not complete CT Calcium Score as she has a history of extensive radiation therapy due to AML in  and does not want to consider further radiation at present. We discussed the fact that CT calcium score is low level radiation.    Past Medical History:  1.  Acute myoblastic leukemia:   2.  History of gestational hypertension  3.  Premature menopause  4.  Obstetric history:   5.  Leukopenia  6.  Insomnia    Past Surgical History:  1.  Bone marrow transplant:   2.  Port placement:     Current Outpatient Medications on File Prior to Visit   Medication Sig Dispense Refill   • amitriptyline (ELAVIL) 25 mg tablet Take 1 tablet (25 mg total) by mouth nightly. 90 tablet 2   • ascorbate calcium (VITAMIN C ORAL) Take 1 tablet by mouth daily.     • " BIOTIN ORAL Take 1 tablet by mouth daily.     • cholecalciferol, vitamin D3, (VITAMIN D3) 1,000 unit capsule Take 1 capsule by mouth daily.     • cyanocobalamin (vitamin B-12) 100 mcg tablet Take 100 mcg by mouth daily.     • CALCIUM ORAL Take by mouth.       No current facility-administered medications on file prior to visit.        Allergies: Patient has no known allergies.    Social History: , Juan Alberto. Former smoker, quit over 40 years ago.  Denies alcohol or illicit drug use.    Family History: Reviewed and noncontributory.    Review of Systems   Constitution: Negative. Negative for decreased appetite, diaphoresis, malaise/fatigue, weight gain and weight loss.        Doing yoga, marianela chi, weights  Lifting heavy weights, less reps  Doing machines up to 150 lb, 8 reps without symptoms.   HENT: Negative for nosebleeds, stridor and tinnitus.    Eyes: Negative.  Negative for blurred vision, double vision, vision loss in left eye, vision loss in right eye and visual disturbance.   Cardiovascular: Positive for leg swelling (occasional ankle, eating out all the time, walking a lot). Negative for chest pain, claudication, cyanosis, dyspnea on exertion, irregular heartbeat, near-syncope, orthopnea, palpitations, paroxysmal nocturnal dyspnea and syncope.        Works out several times/week  20-25 mins weights  4x/week     Respiratory: Negative.  Negative for cough, hemoptysis, shortness of breath, sleep disturbances due to breathing, snoring, sputum production and wheezing.    Endocrine: Negative.  Negative for polyphagia and polyuria.   Hematologic/Lymphatic: Negative for bleeding problem. Does not bruise/bleed easily.   Skin: Negative.  Negative for color change, dry skin, flushing, itching, nail changes, poor wound healing and rash.   Musculoskeletal: Negative for arthritis, back pain, falls, muscle cramps, muscle weakness and myalgias.   Gastrointestinal: Negative.  Negative for bloating, abdominal pain,  anorexia, change in bowel habit, dysphagia, heartburn, hematemesis, hematochezia, melena, nausea and vomiting.   Genitourinary: Negative.  Negative for flank pain, frequency, hematuria and nocturia.   Neurological: Negative.  Negative for brief paralysis, disturbances in coordination, excessive daytime sleepiness, dizziness, focal weakness, headaches, light-headedness and loss of balance.   Psychiatric/Behavioral: Negative.  Negative for altered mental status, depression and substance abuse. The patient is not nervous/anxious.    Allergic/Immunologic: Negative for hives.       Exam:  Objective   Vitals:    11/19/19 1322   BP: 118/76   122/84   Pulse: 82   Resp: 16   SpO2: 99     Body mass index is 22.03 kg/m².  Physical Exam   Constitutional: She is oriented to person, place, and time. She appears well-developed and well-nourished. No distress.   HENT:   Head: Normocephalic and atraumatic.   Mouth/Throat: Mucous membranes are normal. Mucous membranes are not cyanotic.   Eyes: Conjunctivae are normal.   Neck: Normal carotid pulses and no JVD present. No tracheal tenderness present. Carotid bruit is not present. No tracheal deviation and no edema present. No thyroid mass present.   Cardiovascular: Normal rate, regular rhythm, S1 normal and S2 normal.  No extrasystoles are present. PMI is not displaced. Exam reveals no gallop and no S4.   No murmur heard.  Pulses:       Carotid pulses are 2+ on the right side, and 2+ on the left side.       Radial pulses are 2+ on the right side, and 2+ on the left side.        Femoral pulses are 2+ on the right side, and 2+ on the left side.       Popliteal pulses are 2+ on the right side, and 2+ on the left side.        Dorsalis pedis pulses are 2+ on the right side, and 2+ on the left side.        Posterior tibial pulses are 2+ on the right side, and 2+ on the left side.   Pulmonary/Chest: Effort normal and breath sounds normal. No accessory muscle usage or stridor. No respiratory  distress. She has no wheezes. She has no rales.   Abdominal: Soft. Bowel sounds are normal. There is no tenderness.   Musculoskeletal: Normal range of motion. She exhibits no edema.   Lymphadenopathy:     She has no cervical adenopathy.   Neurological: She is alert and oriented to person, place, and time.   Skin: Skin is warm, dry and intact. No cyanosis.   Psychiatric: She has a normal mood and affect. Her speech is normal.   Vitals reviewed.      Labs:  Lab Results   Component Value Date    CHOL 195 01/23/2019    TRIG 136 01/23/2019    HDL 52 (L) 01/23/2019     .    Cardiovascular Studies:   1.  Echocardiogram 12/1/2016 (personally reviewed): Normal left ventricular cavity size and wall thickness with preserved systolic function, EF 60%.  No regional wall motion abnormalities.  False tendon.  Normal right ventricular size with preserved systolic function.  Normal biatrial size.  Mitral leaflets are mildly thickened.  Mild posterior annular calcification.  Mild mitral regurgitation.  Trileaflet aortic valve.  No aortic stenosis or regurgitation.  Mild tricuspid regurgitation.  Estimated RVSP 18 mmHg.      Assessment/Plan   Problem List Items Addressed This Visit        Circulatory    Cardiovascular risk factor     Patient Has a history of gestational hypertension, former tobacco abuse, premature menopause, new diagnosis of hypertension.LDLs in January 2019 were 116.She is Tolerating amlodipine.She did not have a calcium score because she is concerned about the level of radiation.  We discussed that it is considered very low radiation level, equivalent to mammogram,  but she has had extensive radiation therapy due to a history of AML.She is monitoring diet, she is monitoring salt. She is exercising regularly.Once we have updated lipid numbers will recalculate her 10-year ASCVD score.  She did have questions about the ability to continue to lift 155 pounds with a weight lifting machine which covers her upper  "body.  She is able to do 8 reps and then becomes \"completely exhausted\".  She was asked to limit this to 100 pounds for now.She has no personal or family history of aneurysm. Should she develop symptoms with exercise at all, she should stop and call us. Again stressed a CT calcium score would assist us in determining risk.         Relevant Orders    Lipid panel    Essential hypertension - Primary     Amlodipine 2.5 mg initiated by Dr. Nick in September, 2019 with excellent BP control.  Melisa monitors sodium intake, eats primarily plant based diet. She feels well, has had no lightheadedness/dizziness.  She is exercising regularly.  Will make no changes and update labwork to determine ASCVD 10 year risk.  Continue amlodipine; refill sent.           Relevant Medications    amLODIPine (NORVASC) 2.5 mg tablet    Other Relevant Orders    ECG 12 LEAD-OFFICE PERFORMED (Completed)    Comprehensive metabolic panel    CBC    Hemoglobin A1c       Other    Annual physical exam    Relevant Orders    Lipid panel    TSH w reflex FT4    Hemoglobin A1c    History of leukemia     History of Acure Myoblastic Leukemia 1998 s/p chemoradiation, bone marrow transplant.  Echo 2016 as above.  She has no symptoms of shortness of breath, chest pain, palpitations.  Plan to follow echo as determined by Dr. Nick.  Will update labwork as she is due for her annual labs.           Relevant Orders    CBC      Other Visit Diagnoses     Other hypoglycemia         Relevant Orders    Hemoglobin A1c         Thank you for allowing us to participate in the care of this patient; please feel free to call with questions.  Recommend follow up in 6 months, fasting labwork, think about CT calcium score.        This letter was generated using speech recognition software. Please excuse any typographical errors.    Return in about 6 months (around 5/19/2020).        BOOKER Xie   11/19/2019      "

## 2019-11-19 ENCOUNTER — TELEPHONE (OUTPATIENT)
Dept: CARDIOLOGY | Facility: CLINIC | Age: 64
End: 2019-11-19

## 2019-11-19 ENCOUNTER — OFFICE VISIT (OUTPATIENT)
Dept: CARDIOLOGY | Facility: CLINIC | Age: 64
End: 2019-11-19
Payer: MEDICARE

## 2019-11-19 VITALS
RESPIRATION RATE: 16 BRPM | OXYGEN SATURATION: 99 % | HEART RATE: 82 BPM | DIASTOLIC BLOOD PRESSURE: 76 MMHG | BODY MASS INDEX: 21.96 KG/M2 | WEIGHT: 144.9 LBS | HEIGHT: 68 IN | SYSTOLIC BLOOD PRESSURE: 118 MMHG

## 2019-11-19 DIAGNOSIS — Z00.00 ANNUAL PHYSICAL EXAM: ICD-10-CM

## 2019-11-19 DIAGNOSIS — E16.1 OTHER HYPOGLYCEMIA: ICD-10-CM

## 2019-11-19 DIAGNOSIS — Z85.6 HISTORY OF LEUKEMIA: ICD-10-CM

## 2019-11-19 DIAGNOSIS — Z91.89 CARDIOVASCULAR RISK FACTOR: ICD-10-CM

## 2019-11-19 DIAGNOSIS — I10 ESSENTIAL HYPERTENSION: Primary | ICD-10-CM

## 2019-11-19 PROCEDURE — 99214 OFFICE O/P EST MOD 30 MIN: CPT | Performed by: NURSE PRACTITIONER

## 2019-11-19 PROCEDURE — 93000 ELECTROCARDIOGRAM COMPLETE: CPT | Performed by: NURSE PRACTITIONER

## 2019-11-19 RX ORDER — AMLODIPINE BESYLATE 2.5 MG/1
2.5 TABLET ORAL DAILY
Qty: 90 TABLET | Refills: 3 | Status: SHIPPED | OUTPATIENT
Start: 2019-11-19 | End: 2020-04-09 | Stop reason: SDUPTHER

## 2019-11-19 ASSESSMENT — ENCOUNTER SYMPTOMS
BLOATING: 0
DOUBLE VISION: 0
CONSTITUTIONAL NEGATIVE: 1
BACK PAIN: 0
NEUROLOGICAL NEGATIVE: 1
DIAPHORESIS: 0
EXCESSIVE DAYTIME SLEEPINESS: 0
HEMATEMESIS: 0
SPUTUM PRODUCTION: 0
COLOR CHANGE: 0
BLURRED VISION: 0
STRIDOR: 0
NAIL CHANGES: 0
FOCAL WEAKNESS: 0
CHANGE IN BOWEL HABIT: 0
FALLS: 0
HEMOPTYSIS: 0
WEIGHT LOSS: 0
NERVOUS/ANXIOUS: 0
LOSS OF BALANCE: 0
FREQUENCY: 0
POOR WOUND HEALING: 0
HEADACHES: 0
ANOREXIA: 0
EYES NEGATIVE: 1
GASTROINTESTINAL NEGATIVE: 1
PSYCHIATRIC NEGATIVE: 1
DISTURBANCES IN COORDINATION: 0
POLYPHAGIA: 0
ENDOCRINE NEGATIVE: 1
SLEEP DISTURBANCES DUE TO BREATHING: 0
MUSCLE CRAMPS: 0
BRIEF PARALYSIS: 0
DECREASED APPETITE: 0
FLANK PAIN: 0
RESPIRATORY NEGATIVE: 1
HEARTBURN: 0
CLAUDICATION: 0

## 2019-11-19 ASSESSMENT — LIFESTYLE VARIABLES: SUBSTANCE_ABUSE: 0

## 2019-11-19 NOTE — TELEPHONE ENCOUNTER
S/w pt; advised ok to continue exercising as she has been, 155 lb upper body weight machine.  Re: chemo: she will try to locate her records to see what she received.  Re: CT calcium score; I relayed info re: ostial coronary disease as a result of extensive radiation.  She will try to find chemo info, get updated fasting lipids and think about calcium score and get back to us.

## 2019-11-19 NOTE — PATIENT INSTRUCTIONS
YOU LOOK FABULOUS    TRY TO DRINK AT LEAST 48 OZ WATER DAILY    CALL IF DIZZY    THINK ABOUT CALCIUM SCORE    FASTING LABWORK; WE'LL REPEAT YOUR LIPID PANEL (8 HOUR FAST)    SEE DR RIVERS IN ABOUT 6 MOS

## 2019-11-19 NOTE — TELEPHONE ENCOUNTER
1.  Yes as long as she feels ok, ok by me.  2.  What type of chemo? Very good point! Her echo from 2016 was age appropriate but we can monitor every 5 years, repeat in 2021. As for the calcium score, very little radiation so would not deter. Patients who receive significant radiation to the chest are at risk for ostial coronary disease and calcium score is part of recommended screening. Can we get more info on radiation dose/chemo? I would still recommend calcium score if she is willing.

## 2019-11-19 NOTE — ASSESSMENT & PLAN NOTE
Amlodipine 2.5 mg initiated by Dr. Nick in September, 2019 with excellent BP control.  Melisa monitors sodium intake, eats primarily plant based diet. She feels well, has had no lightheadedness/dizziness.  She is exercising regularly.  Will make no changes and update labwork to determine ASCVD 10 year risk.  Continue amlodipine; refill sent.

## 2019-11-19 NOTE — ASSESSMENT & PLAN NOTE
History of Acure Myoblastic Leukemia 1998 s/p chemoradiation, bone marrow transplant.  Echo 2016 as above.  She has no symptoms of shortness of breath, chest pain, palpitations.  Plan to follow echo as determined by Dr. Nick.  Will update labwork as she is due for her annual labs.

## 2019-11-19 NOTE — TELEPHONE ENCOUNTER
BRENNON Walker,  1) weight lifting limit for Melisa: currently lifting (with machine) 155 lb x 8 reps weekly  Are you ok with continuing this?    2) h/o chemo and total body radiation.  Would you like repeat echo in near future or sit tight?    3) she is thinking about calcium score; given h/o radiation therapy, she's very hesitant to get more radiation

## 2019-11-19 NOTE — ASSESSMENT & PLAN NOTE
"Patient Has a history of gestational hypertension, former tobacco abuse, premature menopause, new diagnosis of hypertension.LDLs in January 2019 were 116.She is Tolerating amlodipine.She did not have a calcium score because she is concerned about the level of radiation.  We discussed that it is considered very low radiation level, equivalent to mammogram,  but she has had extensive radiation therapy due to a history of AML.She is monitoring diet, she is monitoring salt. She is exercising regularly.Once we have updated lipid numbers will recalculate her 10-year ASCVD score.  She did have questions about the ability to continue to lift 155 pounds with a weight lifting machine which covers her upper body.  She is able to do 8 reps and then becomes \"completely exhausted\".  She was asked to limit this to 100 pounds for now.She has no personal or family history of aneurysm. Should she develop symptoms with exercise at all, she should stop and call us. Again stressed a CT calcium score would assist us in determining risk.  "

## 2019-11-21 NOTE — PROGRESS NOTES
I was immediately available at the time of this visit.  Agree with assessment and plan as outlined.  Saritha Nick MD, FACC

## 2020-01-31 ENCOUNTER — OFFICE VISIT (OUTPATIENT)
Dept: INTERNAL MEDICINE | Facility: CLINIC | Age: 65
End: 2020-01-31
Payer: MEDICARE

## 2020-01-31 VITALS
DIASTOLIC BLOOD PRESSURE: 70 MMHG | WEIGHT: 149.2 LBS | HEART RATE: 80 BPM | SYSTOLIC BLOOD PRESSURE: 110 MMHG | OXYGEN SATURATION: 98 % | HEIGHT: 68 IN | BODY MASS INDEX: 22.61 KG/M2 | TEMPERATURE: 97.8 F | RESPIRATION RATE: 16 BRPM

## 2020-01-31 DIAGNOSIS — N30.00 ACUTE CYSTITIS WITHOUT HEMATURIA: Primary | ICD-10-CM

## 2020-01-31 LAB
BACTERIA #/AREA URNS HPF: 1 /HPF
BILIRUB UR QL STRIP.AUTO: ABNORMAL MG/DL
BILIRUBIN, POC: NEGATIVE
BLOOD URINE, POC: POSITIVE
CLARITY UR REFRACT.AUTO: ABNORMAL
CLARITY, POC: ABNORMAL
COLOR UR AUTO: ABNORMAL
COLOR, POC: YELLOW
EXPIRATION DATE: ABNORMAL
GLUCOSE UR STRIP.AUTO-MCNC: ABNORMAL MG/DL
GLUCOSE URINE, POC: NEGATIVE
HGB UR QL STRIP.AUTO: NEGATIVE
HYALINE CASTS #/AREA URNS LPF: ABNORMAL /LPF
KETONES UR STRIP.AUTO-MCNC: ABNORMAL MG/DL
KETONES, POC: NEGATIVE
LEUKOCYTE EST, POC: ABNORMAL
LEUKOCYTE ESTERASE UR QL STRIP.AUTO: 3
Lab: ABNORMAL
NITRITE UR QL STRIP.AUTO: ABNORMAL
NITRITE, POC: ABNORMAL
PH UR STRIP.AUTO: ABNORMAL [PH]
PH, POC: 8
POCT MANUFACTURER: ABNORMAL
PROT UR QL STRIP.AUTO: ABNORMAL
PROTEIN, POC: ABNORMAL
RBC #/AREA URNS HPF: ABNORMAL /HPF
SP GR UR REFRACT.AUTO: 1.03
SPECIFIC GRAVITY, POC: 1.01
SQUAMOUS #/AREA URNS HPF: 1 /HPF
TRI-PHOS CRY URNS QL MICRO: 1 /HPF
URNS CMNT MICRO: ABNORMAL
UROBILINOGEN UR STRIP-ACNC: ABNORMAL EU/DL
UROBILINOGEN, POC: 0.2
WBC #/AREA URNS HPF: ABNORMAL /HPF

## 2020-01-31 PROCEDURE — 99213 OFFICE O/P EST LOW 20 MIN: CPT | Performed by: NURSE PRACTITIONER

## 2020-01-31 PROCEDURE — 81003 URINALYSIS AUTO W/O SCOPE: CPT | Performed by: NURSE PRACTITIONER

## 2020-01-31 PROCEDURE — 87086 URINE CULTURE/COLONY COUNT: CPT | Performed by: NURSE PRACTITIONER

## 2020-01-31 PROCEDURE — 81002 URINALYSIS NONAUTO W/O SCOPE: CPT | Performed by: NURSE PRACTITIONER

## 2020-01-31 RX ORDER — CEPHALEXIN 500 MG/1
500 CAPSULE ORAL 2 TIMES DAILY
Qty: 6 CAPSULE | Refills: 0 | Status: SHIPPED | OUTPATIENT
Start: 2020-01-31 | End: 2020-02-03

## 2020-01-31 ASSESSMENT — ENCOUNTER SYMPTOMS
DIARRHEA: 0
FREQUENCY: 1
DIFFICULTY URINATING: 1
FEVER: 0
NAUSEA: 0
FLANK PAIN: 0
HEMATURIA: 0
VOMITING: 0

## 2020-01-31 NOTE — ASSESSMENT & PLAN NOTE
POCT UA:   Color, UA Yellow    Clarity, UA Cloudy    Glucose, UA Negative    Bilirubin, UA Negative    Ketones, UA Negative    Spec Grav, UA 1.015    Blood, UA Positive    pH, UA 8.0    Protein, UA 4+    Urobilinogen, UA 0.2    Leukocytes, UA 4+    Nitrite, UA 4+      Your urine is suspicious for a UTI.     I am going to send off a culture but in the meantime please start Keflex 500 mg 1 tab 2 x daily for 3 days.    Stay well hydrated, 6-8 glasses of fluids daily to help flush your system.

## 2020-01-31 NOTE — PROGRESS NOTES
"Subjective      Patient ID: Melisa Wu is a 64 y.o. female.    Symptoms started a few days ago, pelvic pain, cloudy urine, odd smell, no fevers.           The following have been reviewed and updated as appropriate in this visit:  Meds          No Known Allergies    Review of Systems   Constitutional: Negative for fever.   HENT: Negative.    Gastrointestinal: Negative for diarrhea, nausea and vomiting.   Genitourinary: Positive for difficulty urinating, frequency and pelvic pain. Negative for flank pain and hematuria.       Objective     Vitals:    01/31/20 1059   BP: 110/70   BP Location: Left upper arm   Patient Position: Sitting   Pulse: 80   Resp: 16   Temp: 36.6 °C (97.8 °F)   TempSrc: Oral   SpO2: 98%   Weight: 67.7 kg (149 lb 3.2 oz)   Height: 1.727 m (5' 8\")         Physical Exam   Constitutional: She is oriented to person, place, and time. She appears well-developed and well-nourished. No distress.   HENT:   Head: Normocephalic and atraumatic.   Eyes: Conjunctivae are normal.   Neck: Normal range of motion.   Cardiovascular: Normal rate, regular rhythm and normal heart sounds.   Pulmonary/Chest: Effort normal and breath sounds normal. No respiratory distress. She has no wheezes. She has no rales.   Abdominal: Soft. Bowel sounds are normal. She exhibits no distension. There is no tenderness (No pelvic tenderness with palpation). There is no CVA tenderness.   Musculoskeletal: Normal range of motion.   Neurological: She is alert and oriented to person, place, and time.   Skin: Skin is warm and dry. She is not diaphoretic.   Psychiatric: She has a normal mood and affect. Her behavior is normal. Thought content normal.   Vitals reviewed.      Assessment/Plan   Problem List Items Addressed This Visit        Genitourinary    Acute cystitis without hematuria - Primary     POCT UA:   Color, UA Yellow    Clarity, UA Cloudy    Glucose, UA Negative    Bilirubin, UA Negative    Ketones, UA Negative    Spec Grav, " UA 1.015    Blood, UA Positive    pH, UA 8.0    Protein, UA 4+    Urobilinogen, UA 0.2    Leukocytes, UA 4+    Nitrite, UA 4+      Your urine is suspicious for a UTI.     I am going to send off a culture but in the meantime please start Keflex 500 mg 1 tab 2 x daily for 3 days.    Stay well hydrated, 6-8 glasses of fluids daily to help flush your system.             Relevant Orders    POCT urinalysis dipstick (Completed)    Urinalysis with Reflex Culture    UA Reflex to Culture (Macroscopic)

## 2020-01-31 NOTE — PATIENT INSTRUCTIONS
Your urine is suspicious for a UTI.     I am going to send off a culture but in the meantime please start Keflex 500 mg 1 tab 2 x daily for 3 days.    Stay well hydrated, 6-8 glasses of fluids daily to help flush your system.

## 2020-02-03 LAB
BACTERIA UR CULT: ABNORMAL

## 2020-02-03 RX ORDER — CEFUROXIME AXETIL 500 MG/1
500 TABLET ORAL 2 TIMES DAILY
Qty: 6 TABLET | Refills: 0 | Status: SHIPPED | OUTPATIENT
Start: 2020-02-03 | End: 2020-02-06

## 2020-02-12 LAB
ALBUMIN SERPL-MCNC: 4.4 G/DL (ref 3.8–4.8)
ALBUMIN/GLOB SERPL: 2.4 {RATIO} (ref 1.2–2.2)
ALP SERPL-CCNC: 85 IU/L (ref 39–117)
ALT SERPL-CCNC: 13 IU/L (ref 0–32)
AST SERPL-CCNC: 19 IU/L (ref 0–40)
BASOPHILS # BLD AUTO: 0 X10E3/UL (ref 0–0.2)
BASOPHILS NFR BLD AUTO: 1 %
BILIRUB SERPL-MCNC: <0.2 MG/DL (ref 0–1.2)
BUN SERPL-MCNC: 19 MG/DL (ref 8–27)
BUN/CREAT SERPL: 18 (ref 12–28)
CALCIUM SERPL-MCNC: 9.9 MG/DL (ref 8.7–10.3)
CHLORIDE SERPL-SCNC: 103 MMOL/L (ref 96–106)
CHOLEST SERPL-MCNC: 200 MG/DL (ref 100–199)
CO2 SERPL-SCNC: 26 MMOL/L (ref 20–29)
CREAT SERPL-MCNC: 1.07 MG/DL (ref 0.57–1)
EOSINOPHIL # BLD AUTO: 0.1 X10E3/UL (ref 0–0.4)
EOSINOPHIL NFR BLD AUTO: 2 %
ERYTHROCYTE [DISTWIDTH] IN BLOOD BY AUTOMATED COUNT: 13.8 % (ref 11.7–15.4)
GLOBULIN SER CALC-MCNC: 1.8 G/DL (ref 1.5–4.5)
GLUCOSE SERPL-MCNC: 106 MG/DL (ref 65–99)
HBA1C MFR BLD: 5.7 % (ref 4.8–5.6)
HCT VFR BLD AUTO: 38.9 % (ref 34–46.6)
HDLC SERPL-MCNC: 56 MG/DL
HGB BLD-MCNC: 13 G/DL (ref 11.1–15.9)
IMM GRANULOCYTES # BLD AUTO: 0 X10E3/UL (ref 0–0.1)
IMM GRANULOCYTES NFR BLD AUTO: 0 %
LAB CORP EGFR IF AFRICN AM: 63 ML/MIN/1.73
LAB CORP EGFR IF NONAFRICN AM: 54 ML/MIN/1.73
LDLC SERPL CALC-MCNC: 114 MG/DL (ref 0–99)
LYMPHOCYTES # BLD AUTO: 1.7 X10E3/UL (ref 0.7–3.1)
LYMPHOCYTES NFR BLD AUTO: 46 %
MCH RBC QN AUTO: 30.5 PG (ref 26.6–33)
MCHC RBC AUTO-ENTMCNC: 33.4 G/DL (ref 31.5–35.7)
MCV RBC AUTO: 91 FL (ref 79–97)
MONOCYTES # BLD AUTO: 0.3 X10E3/UL (ref 0.1–0.9)
MONOCYTES NFR BLD AUTO: 8 %
NEUTROPHILS # BLD AUTO: 1.7 X10E3/UL (ref 1.4–7)
NEUTROPHILS NFR BLD AUTO: 43 %
PLATELET # BLD AUTO: 221 X10E3/UL (ref 150–450)
POTASSIUM SERPL-SCNC: 5 MMOL/L (ref 3.5–5.2)
PROT SERPL-MCNC: 6.2 G/DL (ref 6–8.5)
RBC # BLD AUTO: 4.26 X10E6/UL (ref 3.77–5.28)
SODIUM SERPL-SCNC: 141 MMOL/L (ref 134–144)
TRIGL SERPL-MCNC: 148 MG/DL (ref 0–149)
TSH SERPL DL<=0.005 MIU/L-ACNC: 4.31 UIU/ML (ref 0.45–4.5)
VLDLC SERPL CALC-MCNC: 30 MG/DL (ref 5–40)
WBC # BLD AUTO: 3.8 X10E3/UL (ref 3.4–10.8)

## 2020-02-25 ENCOUNTER — TELEPHONE (OUTPATIENT)
Dept: CARDIOLOGY | Facility: CLINIC | Age: 65
End: 2020-02-25

## 2020-02-25 NOTE — TELEPHONE ENCOUNTER
Denise, patient dropped off medical records/disc from [place] regarding her chemo from the 1990s.  I will put under your door.  I'm not able to open the disc.

## 2020-02-27 ENCOUNTER — TELEPHONE (OUTPATIENT)
Dept: CARDIOLOGY | Facility: CLINIC | Age: 65
End: 2020-02-27

## 2020-02-27 NOTE — TELEPHONE ENCOUNTER
Patient of Dr Heart- Patient calling.  States she was started on amlodipine 2.5mg  at 11/19/19 OV States Dr heart instructed she may need to take miralax with this medication      States just started needing miralax but instructions on bottle states not to take more than 7 days.     Last OV 11/19/19  Next OV 5/7/20    HX essential hypertension    NO other med changes    /70 80 ov pcp 1/31/20    Please contact patient

## 2020-03-03 NOTE — TELEPHONE ENCOUNTER
Records reviewed.  She received fillgastim, cyclophosphamide, thio-TEPA, and TBI radiation with cumulative dose of 1500.  No mention of cardiac exposure.

## 2020-04-08 ENCOUNTER — TELEPHONE (OUTPATIENT)
Dept: GYNECOLOGY | Facility: CLINIC | Age: 65
End: 2020-04-08

## 2020-04-08 RX ORDER — AMITRIPTYLINE HYDROCHLORIDE 25 MG/1
25 TABLET, FILM COATED ORAL NIGHTLY
Qty: 90 TABLET | Refills: 2 | Status: SHIPPED | OUTPATIENT
Start: 2020-04-08 | End: 2020-12-26 | Stop reason: SDUPTHER

## 2020-04-08 NOTE — TELEPHONE ENCOUNTER
Melisa requested a med refill: amitriptyline (ELAVIL) 25 mg tablet.  Please send to [facility] pharmacy.

## 2020-04-09 ENCOUNTER — TELEPHONE (OUTPATIENT)
Dept: GYNECOLOGY | Facility: CLINIC | Age: 65
End: 2020-04-09

## 2020-04-09 ENCOUNTER — TELEPHONE (OUTPATIENT)
Dept: CARDIOLOGY | Facility: CLINIC | Age: 65
End: 2020-04-09

## 2020-04-09 RX ORDER — AMLODIPINE BESYLATE 2.5 MG/1
2.5 TABLET ORAL DAILY
Qty: 90 TABLET | Refills: 3 | Status: SHIPPED | OUTPATIENT
Start: 2020-04-09 | End: 2021-06-03 | Stop reason: SDUPTHER

## 2020-04-09 NOTE — TELEPHONE ENCOUNTER
Pt called asking if she could have her medication Amitriptyline switched to [facility] that's currently in chart Thanks

## 2020-04-15 NOTE — TELEPHONE ENCOUNTER
I asked patient if she would like tele med apt, or to r/s for June or July, pt states she does not know yet if she wants to do tele med, or if she will be coming out of the house by June or July.    Pt is unsure at this time  Pt states she can be reached at   [phone]

## 2020-04-21 ENCOUNTER — TELEPHONE (OUTPATIENT)
Dept: CARDIOLOGY | Facility: CLINIC | Age: 65
End: 2020-04-21

## 2020-04-21 NOTE — TELEPHONE ENCOUNTER
Caller: Melisa Wu  Phone: [phone]  Regard: OV    Pt inquires what all can be accomplished during a telemed visit if she typically has BP checked in office. She has portable BP cuff but states Dr Nick advised against its use due to increase in anxiety. Pt will decide on Telemed Visit or OV once she speaks with Dr Nick.

## 2020-04-21 NOTE — TELEPHONE ENCOUNTER
Can you call her? It is ok for her to push out appointment or we can do a telemed video visit and use her cuff. I leave it up to her. TY

## 2020-04-21 NOTE — TELEPHONE ENCOUNTER
Noted. I spoke with patient she would like her current appt switched to tele med appt if ok?    PSR Pool see above appt request if ok with Dr. Nick- TY

## 2020-05-08 ENCOUNTER — TELEMEDICINE (OUTPATIENT)
Dept: CARDIOLOGY | Facility: CLINIC | Age: 65
End: 2020-05-08
Payer: MEDICARE

## 2020-05-08 DIAGNOSIS — R73.01 IFG (IMPAIRED FASTING GLUCOSE): Primary | ICD-10-CM

## 2020-05-08 DIAGNOSIS — I10 ESSENTIAL HYPERTENSION: ICD-10-CM

## 2020-05-08 DIAGNOSIS — Z91.89 CARDIOVASCULAR RISK FACTOR: ICD-10-CM

## 2020-05-08 DIAGNOSIS — Z87.59 HISTORY OF GESTATIONAL HYPERTENSION: ICD-10-CM

## 2020-05-08 PROCEDURE — 99442 PR PHYS/QHP TELEPHONE EVALUATION 11-20 MIN: CPT | Mod: 95 | Performed by: INTERNAL MEDICINE

## 2020-12-24 ENCOUNTER — TELEPHONE (OUTPATIENT)
Dept: GYNECOLOGY | Facility: CLINIC | Age: 65
End: 2020-12-24

## 2020-12-24 NOTE — TELEPHONE ENCOUNTER
Dr. Burton,           Med refill request: amitriptyline (ELAVIL) 25 mg tablet, Take 1 tablet (25 mg total) by mouth nightly.        [id number] - [place] - [address]  [phone]        Thank you

## 2020-12-26 RX ORDER — AMITRIPTYLINE HYDROCHLORIDE 25 MG/1
25 TABLET, FILM COATED ORAL NIGHTLY
Qty: 90 TABLET | Refills: 2 | Status: SHIPPED | OUTPATIENT
Start: 2020-12-26 | End: 2021-06-03 | Stop reason: SDUPTHER

## 2021-01-14 ENCOUNTER — TELEMEDICINE (OUTPATIENT)
Dept: INTERNAL MEDICINE | Facility: CLINIC | Age: 66
End: 2021-01-14
Payer: MEDICARE

## 2021-01-14 DIAGNOSIS — R82.90 CLOUDY URINE: Primary | ICD-10-CM

## 2021-01-14 PROBLEM — N30.00 ACUTE CYSTITIS WITHOUT HEMATURIA: Status: RESOLVED | Noted: 2020-01-31 | Resolved: 2021-01-14

## 2021-01-14 LAB
BACTERIA URNS QL MICRO: 2 /HPF
BILIRUB UR QL STRIP.AUTO: NEGATIVE MG/DL
CLARITY UR REFRACT.AUTO: CLEAR
COLOR UR AUTO: YELLOW
GLUCOSE UR STRIP.AUTO-MCNC: NEGATIVE MG/DL
HGB UR QL STRIP.AUTO: NEGATIVE
HYALINE CASTS #/AREA URNS LPF: ABNORMAL /LPF
KETONES UR STRIP.AUTO-MCNC: NEGATIVE MG/DL
LEUKOCYTE ESTERASE UR QL STRIP.AUTO: 2
NITRITE UR QL STRIP.AUTO: NEGATIVE
PH UR STRIP.AUTO: 6.5 [PH]
PROT UR QL STRIP.AUTO: NEGATIVE
RBC #/AREA URNS HPF: ABNORMAL /HPF
SP GR UR REFRACT.AUTO: 1.01
SQUAMOUS URNS QL MICRO: ABNORMAL /HPF
UROBILINOGEN UR STRIP-ACNC: 0.2 EU/DL
WBC #/AREA URNS HPF: ABNORMAL /HPF

## 2021-01-14 PROCEDURE — 81001 URINALYSIS AUTO W/SCOPE: CPT | Performed by: INTERNAL MEDICINE

## 2021-01-14 PROCEDURE — 87086 URINE CULTURE/COLONY COUNT: CPT | Performed by: INTERNAL MEDICINE

## 2021-01-14 PROCEDURE — 99213 OFFICE O/P EST LOW 20 MIN: CPT | Mod: 95 | Performed by: INTERNAL MEDICINE

## 2021-01-14 NOTE — PROGRESS NOTES
Verification of Patient Location:  The patient affirms they are currently located in the following state: [place]    Request for Consent:    Video Encounter   Nisreen, my name is Anitha Benítez MD.  Before we proceed, can you please verify your identification by telling me your full name and date of birth?  Can you tell me who is in the room with you?    You and I are about to have a telemedicine check-in or visit because you have requested it.  This is a live video-conference.  I am a real person, speaking to you in real time.  There is no one else with me on the video-conference.  However, when we use ([employer], [employer], etc) it is important for you to know that the video-conference may not be secure or private.  I am not recording this conversation and I am asking you not to record it.  This telemedicine visit will be billed to your health insurance or you, if you are self-insured.  You understand you will be responsible for any copayments or coinsurances that apply to your telemedicine visit.  Communication platform used for this encounter:  Integrated Zoom via [employer] Video Visit     Before starting our telemedicine visit, I am required to get your consent for this virtual check-in or visit by telemedicine. Do you consent?      Patient Response to Request for Consent:  Yes      Visit Documentation:  Subjective     Patient ID: Melisa Wu is a 65 y.o. female.  1955    Chief Complaint   Patient presents with   • UTI       HPI    Cloudy urine x 1.5 - 2  Days  Change in odor   Today her symptoms seem a little better.     Generally has urgency, but no more than usual.   No increased frequency   Slight dysuria  No hematuria     No pelvic pain.   No vaginal discharge        The following have been reviewed and updated as appropriate in this visit:  Allergies  Meds  Problems          Review of Systems  Per HPI     Assessment/Plan   Diagnoses and all orders for this visit:    Cloudy urine  (Primary)  Assessment & Plan:  Will check urinalysis to rule out UTI.     Orders:  -     Urinalysis with Reflex Culture  -     UA Reflex to Culture (Macroscopic)      Time Spent:  I spent 12 minutes on this date of service performing the following activities: obtaining history, entering orders, documenting and coordinating care.

## 2021-01-15 ENCOUNTER — TELEPHONE (OUTPATIENT)
Dept: INTERNAL MEDICINE | Facility: CLINIC | Age: 66
End: 2021-01-15

## 2021-01-15 PROBLEM — Z23 NEED FOR VACCINATION WITH 13-POLYVALENT PNEUMOCOCCAL CONJUGATE VACCINE: Status: RESOLVED | Noted: 2019-01-23 | Resolved: 2021-01-15

## 2021-01-15 PROBLEM — Z11.59 NEED FOR HEPATITIS C SCREENING TEST: Status: RESOLVED | Noted: 2019-01-23 | Resolved: 2021-01-15

## 2021-01-15 PROBLEM — Z12.39 BREAST SCREENING: Status: RESOLVED | Noted: 2019-01-23 | Resolved: 2021-01-15

## 2021-01-15 RX ORDER — NITROFURANTOIN 25; 75 MG/1; MG/1
100 CAPSULE ORAL 2 TIMES DAILY
Qty: 10 CAPSULE | Refills: 0 | Status: SHIPPED | OUTPATIENT
Start: 2021-01-15 | End: 2021-01-16 | Stop reason: ALTCHOICE

## 2021-01-15 NOTE — TELEPHONE ENCOUNTER
Called patient to review results. Rx macrobid BID x 5 days. Call if no improvement.  Counseled to receive COVID-19 vaccine.

## 2021-01-15 NOTE — TELEPHONE ENCOUNTER
Dr. Benítez    Pt returning your call, she also stated that she would like her script sent to [facility] and their number is [phone]    Thank You

## 2021-01-16 LAB
BACTERIA UR CULT: ABNORMAL
BACTERIA UR CULT: ABNORMAL

## 2021-01-16 RX ORDER — SULFAMETHOXAZOLE AND TRIMETHOPRIM 800; 160 MG/1; MG/1
1 TABLET ORAL 2 TIMES DAILY
Qty: 6 TABLET | Refills: 0 | Status: SHIPPED | OUTPATIENT
Start: 2021-01-16 | End: 2021-01-19

## 2021-02-23 ENCOUNTER — TELEMEDICINE (OUTPATIENT)
Dept: INTERNAL MEDICINE | Facility: CLINIC | Age: 66
End: 2021-02-23
Payer: MEDICARE

## 2021-02-23 DIAGNOSIS — N39.0 RECURRENT UTI: Primary | ICD-10-CM

## 2021-02-23 PROCEDURE — 99214 OFFICE O/P EST MOD 30 MIN: CPT | Mod: 95 | Performed by: INTERNAL MEDICINE

## 2021-02-23 RX ORDER — SULFAMETHOXAZOLE AND TRIMETHOPRIM 800; 160 MG/1; MG/1
1 TABLET ORAL 2 TIMES DAILY
Qty: 10 TABLET | Refills: 0 | Status: SHIPPED | OUTPATIENT
Start: 2021-02-23 | End: 2021-02-28

## 2021-02-23 NOTE — ASSESSMENT & PLAN NOTE
Symptoms consistent with prior UTI's.  Most recent UTI in Jan 2020, urine culture grew Proteus, resistance noted     - start bactrim twice daily x 5 days   - if no improvement, will get u/a and culture   - ultrasound kidneys & bladder to rule out structural etiologies for recurrent UTI  - GYN appt once fully vaccinated

## 2021-02-23 NOTE — PROGRESS NOTES
Verification of Patient Location:  The patient affirms they are currently located in the following state: [place]    Request for Consent:    Video Encounter   Nisreen, my name is Anitha Benítez MD.  Before we proceed, can you please verify your identification by telling me your full name and date of birth?  Can you tell me who is in the room with you?    You and I are about to have a telemedicine check-in or visit because you have requested it.  This is a live video-conference.  I am a real person, speaking to you in real time.  There is no one else with me on the video-conference.  However, when we use ([employer], [employer], etc) it is important for you to know that the video-conference may not be secure or private.  I am not recording this conversation and I am asking you not to record it.  This telemedicine visit will be billed to your health insurance or you, if you are self-insured.  You understand you will be responsible for any copayments or coinsurances that apply to your telemedicine visit.  Communication platform used for this encounter:  Integrated Zoom via [email or web address] Video Visit     Before starting our telemedicine visit, I am required to get your consent for this virtual check-in or visit by telemedicine. Do you consent?      Patient Response to Request for Consent:  Yes      Visit Documentation:  Subjective     Patient ID: Melisa Wu is a 65 y.o. female.  1955    Chief Complaint   Patient presents with   • UTI       HPI    Patient call today w/ concern for another UTI.  Symptoms started Sunday night into Monday morning. She tried to wait to see if her symptoms would get better, but they did not.     Current symptoms:   - vaginal burning   - urine is foamy, discolored   - no dysuria   - urgency w/ minimal voiding   - some suprapubic discomfort   - no vaginal discharge or pruritis     Symptoms are similar to prior UTI.   Her symptoms completely resolved after taking Bactrim last month.      Possible vaginal dryness.     2nd COVID vaccine is scheduled for next week.     The following have been reviewed and updated as appropriate in this visit:  Allergies  Meds  Problems       Review of Systems  Per HPI     Assessment/Plan   Diagnoses and all orders for this visit:    Recurrent UTI (Primary)  Assessment & Plan:  Symptoms consistent with prior UTI's.  Most recent UTI in Jan 2020, urine culture grew Proteus, resistance noted     - start bactrim twice daily x 5 days   - if no improvement, will get u/a and culture   - ultrasound kidneys & bladder to rule out structural etiologies for recurrent UTI  - GYN appt once fully vaccinated     Orders:  -     sulfamethoxazole-trimethoprim (BACTRIM DS) 800-160 mg per tablet; Take 1 tablet by mouth 2 (two) times a day for 5 days.  -     ULTRASOUND KIDNEYS / BLADDER; Future      Time Spent:  I spent 22 minutes on this date of service performing the following activities: obtaining history, entering orders, documenting and providing counseling and education.

## 2021-03-22 ENCOUNTER — TELEPHONE (OUTPATIENT)
Dept: GYNECOLOGY | Facility: CLINIC | Age: 66
End: 2021-03-22

## 2021-03-22 DIAGNOSIS — Z12.31 SCREENING MAMMOGRAM, ENCOUNTER FOR: Primary | ICD-10-CM

## 2021-03-22 DIAGNOSIS — Z80.3 FAMILY HISTORY OF BREAST CANCER: ICD-10-CM

## 2021-03-22 NOTE — TELEPHONE ENCOUNTER
Dr. Seay,         Pt called and request her mammo script, she is having it done at [facility].          Thank you        Please call when done

## 2021-04-13 DIAGNOSIS — Z23 ENCOUNTER FOR IMMUNIZATION: ICD-10-CM

## 2021-05-07 ENCOUNTER — OFFICE VISIT (OUTPATIENT)
Dept: CARDIOLOGY | Facility: CLINIC | Age: 66
End: 2021-05-07
Payer: MEDICARE

## 2021-05-07 VITALS
WEIGHT: 145 LBS | SYSTOLIC BLOOD PRESSURE: 124 MMHG | HEART RATE: 66 BPM | HEIGHT: 68 IN | DIASTOLIC BLOOD PRESSURE: 84 MMHG | BODY MASS INDEX: 21.98 KG/M2

## 2021-05-07 DIAGNOSIS — I10 ESSENTIAL HYPERTENSION: Primary | ICD-10-CM

## 2021-05-07 DIAGNOSIS — R73.01 IFG (IMPAIRED FASTING GLUCOSE): ICD-10-CM

## 2021-05-07 DIAGNOSIS — Z91.89 CARDIOVASCULAR RISK FACTOR: ICD-10-CM

## 2021-05-07 PROCEDURE — G8752 SYS BP LESS 140: HCPCS | Performed by: INTERNAL MEDICINE

## 2021-05-07 PROCEDURE — 99214 OFFICE O/P EST MOD 30 MIN: CPT | Performed by: INTERNAL MEDICINE

## 2021-05-07 PROCEDURE — G8754 DIAS BP LESS 90: HCPCS | Performed by: INTERNAL MEDICINE

## 2021-05-07 PROCEDURE — 93000 ELECTROCARDIOGRAM COMPLETE: CPT | Performed by: INTERNAL MEDICINE

## 2021-05-07 NOTE — LETTER
May 10, 2021     Rocky Park MD  [address]  [address], [facility]  [place] [address]    Patient: Melisa Wu  YOB: 1955  Date of Visit: 5/7/2021      Dear Dr. Park:    Thank you for referring Melisa Wu to me for evaluation. Below are my notes for this consultation.    If you have questions, please do not hesitate to call me. I look forward to following your patient along with you.         Sincerely,        Saritha Nick MD        CC: No Recipients  Saritha Nick MD  5/10/2021 10:15 AM  Sign when Signing Visit   Saritha Nick MD, [facility]  Cardiology    [facility] HEART [facility]  [facility] Level  [address] [place] [address]    TEL  [phone]  [facility]     May 7, 2021    Reason for visit: Cardiovascular follow-up.    Melisa Wu is a 65 y.o. female who presents for cardiovascular follow-up. Kelin has a past medical history of hypertension, gestational hypertension, premature menopause, and prediabetes.  I last saw her via telemedicine on May 8, 2020 at which time she was without cardiovascular symptom or concern.  I recommended a coronary calcium score which she has not yet had completed.    Interval history obtained from the patient and extensive review of all available medical records.  Last labs were completed in February 2020, updated labs ordered today.    Today, Melisa returns for cardiovascular follow-up and reports feeling well.  She is not exercising as much as she would like or should be.  She is participating in yoga and feels well with activity.  She plans to reestablish care with Dr. Kelly at the end of this month.      She is without cardiovascular symptom or limitation.  She has been exercising daily using online videos as well as walking regularly.  She denies cardiovascular symptom limitation.  She is without chest pain, shortness of breath, orthopnea, PND,  lower extremity edema, palpitations, dizziness, lightheadedness, or syncope.  She continues amlodipine 2.5 mg daily.        Past Medical History:  1.  Acute myoblastic leukemia:   2.  History of gestational hypertension  3.  Premature menopause  4.  Obstetric history:   5.  Leukopenia  6.  Insomnia    Past Surgical History:  1.  Bone marrow transplant:   2.  Port placement:     Medications: Amlodipine 2.5 mg daily, amitriptyline 25 mg nightly, vitamin B12 100 mcg daily, vitamin D 1000 units daily, biotin, vitamin C.    Allergies: Patient has no known allergies.    Social History: , [place]. Former smoker, quit over 40 years ago.  Denies alcohol or illicit drug use.    Family History: Reviewed and noncontributory.    Exam:  Objective   Vitals:    21 1401   BP: 124/84   Pulse: 66     Body mass index is 22.05 kg/m².  Physical Exam  Constitutional:       General: She is not in acute distress.     Appearance: She is well-developed.   HENT:      Head: Normocephalic.      Mouth/Throat:      Mouth: Mucous membranes are not cyanotic.   Eyes:      Conjunctiva/sclera: Conjunctivae normal.   Neck:      Vascular: No carotid bruit or JVD.   Cardiovascular:      Rate and Rhythm: Normal rate and regular rhythm.  No extrasystoles are present.     Pulses:           Carotid pulses are 2+ on the right side and 2+ on the left side.       Radial pulses are 2+ on the right side and 2+ on the left side.      Heart sounds: S1 normal and S2 normal. No murmur heard.   No gallop.    Pulmonary:      Effort: Pulmonary effort is normal. No respiratory distress.      Breath sounds: No wheezing or rales.   Abdominal:      General: Bowel sounds are normal.      Palpations: Abdomen is soft.      Tenderness: There is no abdominal tenderness.   Lymphadenopathy:      Cervical: No cervical adenopathy.   Skin:     General: Skin is warm and dry.   Neurological:      Mental Status: She is alert.   Psychiatric:          Speech: Speech normal.         Labs:  Lab Results   Component Value Date    WBC 3.8 02/11/2020    HGB 13.0 02/11/2020     02/11/2020    CHOL 200 (H) 02/11/2020    TRIG 148 02/11/2020    HDL 56 02/11/2020    ALT 13 02/11/2020    AST 19 02/11/2020     02/11/2020    K 5.0 02/11/2020    CREATININE 1.07 (H) 02/11/2020    TSH 4.310 02/11/2020    HGBA1C 5.7 (H) 02/11/2020     Personally reviewed, my interpretation: .    Cardiovascular Studies:   1.  Echocardiogram 12/1/2016 (personally reviewed): Normal left ventricular cavity size and wall thickness with preserved systolic function, EF 60%.  No regional wall motion abnormalities.  False tendon.  Normal right ventricular size with preserved systolic function.  Normal biatrial size.  Mitral leaflets are mildly thickened.  Mild posterior annular calcification.  Mild mitral regurgitation.  Trileaflet aortic valve.  No aortic stenosis or regurgitation.  Mild tricuspid regurgitation.  Estimated RVSP 18 mmHg.    EKG from today personally.  Discussed the patient shows sinus rhythm, poor R wave progression.    Assessment/Plan   Problem List Items Addressed This Visit        Circulatory    Cardiovascular risk factor     Melisa has a history of gestational hypertension, now hypertension, former tobacco use (although quit over 40 years ago),  impaired fasting glucose, and premature menopause.  Discussed increased risk of cardiovascular disease in women with hypertensive complications of pregnancy as well as impaired fasting glucose and premature menopause.   Very notably, she also received total body irradiation with a cumulative dose of 1500, cardiac exposure unknown.  Recent lipids from 2/11/20 reviewed and show acceptable levels with LDL measuring 114.  --Blood pressure management as above.  --Lifestyle modification discussed.  She maintains a healthy weight with heart healthy diet.   --Recommend coronary calcium score.  Reviewed risks and benefits including  out-of-pocket cost.  She will think about it as she is understandably concerned about radiation exposure.         Relevant Orders    CT HEART CORONARY CALCIUM SCORE    Essential hypertension - Primary     Blood pressure acceptable on exam today, measuring 124/84 mmHg.  --Continue amlodipine 2.5 mg daily.  --Goal blood pressure less than 130/80 mmHg.  --Lifestyle modification discussed, particularly regular exercise.         Relevant Orders    ECG 12 lead (Completed)    Lipid panel    Comprehensive metabolic panel       Endocrine/Metabolic    IFG (impaired fasting glucose)     Very notably, hemoglobin A1c measured 5.7% on 2/11/2020, consistent with impaired fasting glucose.  We discussed this finding at length.  It is certainly unexpected given normal body weight, healthy diet, and regular exercise.  She did lose approximately 3 to 4 pounds since the onset of the viral pandemic with quarantine.  Reviewed that this may drop her back into a normal range.  Encouraged continued heart healthy habits.  --Recommend repeat hemoglobin A1c.         Relevant Orders    Hemoglobin A1c             This letter was generated using speech recognition software. Please excuse any typographical errors.    Return in about 1 year (around 5/7/2022).        Saritha Nick MD, [facility]

## 2021-05-07 NOTE — ASSESSMENT & PLAN NOTE
Very notably, hemoglobin A1c measured 5.7% on 2/11/2020, consistent with impaired fasting glucose.  We discussed this finding at length.  It is certainly unexpected given normal body weight, healthy diet, and regular exercise.  She did lose approximately 3 to 4 pounds since the onset of the viral pandemic with quarantine.  Reviewed that this may drop her back into a normal range.  Encouraged continued heart healthy habits.  --Recommend repeat hemoglobin A1c.

## 2021-05-07 NOTE — PROGRESS NOTES
Saritha Nick MD, [facility]  Cardiology    [facility]    [facility]  [facility] [address]  [address] [place] [address]    TEL  [phone]  [facility]     May 7, 2021    Reason for visit: Cardiovascular follow-up.    Melisa Wu is a 65 y.o. female who presents for cardiovascular follow-up. Kelin has a past medical history of hypertension, gestational hypertension, premature menopause, and prediabetes.  I last saw her via telemedicine on May 8, 2020 at which time she was without cardiovascular symptom or concern.  I recommended a coronary calcium score which she has not yet had completed.    Interval history obtained from the patient and extensive review of all available medical records.  Last labs were completed in 2020, updated labs ordered today.    Today, Melisa returns for cardiovascular follow-up and reports feeling well.  She is not exercising as much as she would like or should be.  She is participating in yoga and feels well with activity.  She plans to reestablish care with Dr. Kelly at the end of this month.      She is without cardiovascular symptom or limitation.  She has been exercising daily using online videos as well as walking regularly.  She denies cardiovascular symptom limitation.  She is without chest pain, shortness of breath, orthopnea, PND, lower extremity edema, palpitations, dizziness, lightheadedness, or syncope.  She continues amlodipine 2.5 mg daily.        Past Medical History:  1.  Acute myoblastic leukemia:   2.  History of gestational hypertension  3.  Premature menopause  4.  Obstetric history:   5.  Leukopenia  6.  Insomnia    Past Surgical History:  1.  Bone marrow transplant:   2.  Port placement:     Medications: Amlodipine 2.5 mg daily, amitriptyline 25 mg nightly, vitamin B12 100 mcg daily, vitamin D 1000 units daily, biotin, vitamin C.    Allergies: Patient has no known  allergies.    Social History: , [place]. Former smoker, quit over 40 years ago.  Denies alcohol or illicit drug use.    Family History: Reviewed and noncontributory.    Exam:  Objective   Vitals:    05/07/21 1401   BP: 124/84   Pulse: 66     Body mass index is 22.05 kg/m².  Physical Exam  Constitutional:       General: She is not in acute distress.     Appearance: She is well-developed.   HENT:      Head: Normocephalic.      Mouth/Throat:      Mouth: Mucous membranes are not cyanotic.   Eyes:      Conjunctiva/sclera: Conjunctivae normal.   Neck:      Vascular: No carotid bruit or JVD.   Cardiovascular:      Rate and Rhythm: Normal rate and regular rhythm.  No extrasystoles are present.     Pulses:           Carotid pulses are 2+ on the right side and 2+ on the left side.       Radial pulses are 2+ on the right side and 2+ on the left side.      Heart sounds: S1 normal and S2 normal. No murmur heard.   No gallop.    Pulmonary:      Effort: Pulmonary effort is normal. No respiratory distress.      Breath sounds: No wheezing or rales.   Abdominal:      General: Bowel sounds are normal.      Palpations: Abdomen is soft.      Tenderness: There is no abdominal tenderness.   Lymphadenopathy:      Cervical: No cervical adenopathy.   Skin:     General: Skin is warm and dry.   Neurological:      Mental Status: She is alert.   Psychiatric:         Speech: Speech normal.         Labs:  Lab Results   Component Value Date    WBC 3.8 02/11/2020    HGB 13.0 02/11/2020     02/11/2020    CHOL 200 (H) 02/11/2020    TRIG 148 02/11/2020    HDL 56 02/11/2020    ALT 13 02/11/2020    AST 19 02/11/2020     02/11/2020    K 5.0 02/11/2020    CREATININE 1.07 (H) 02/11/2020    TSH 4.310 02/11/2020    HGBA1C 5.7 (H) 02/11/2020     Personally reviewed, my interpretation: .    Cardiovascular Studies:   1.  Echocardiogram 12/1/2016 (personally reviewed): Normal left ventricular cavity size and wall thickness with  preserved systolic function, EF 60%.  No regional wall motion abnormalities.  False tendon.  Normal right ventricular size with preserved systolic function.  Normal biatrial size.  Mitral leaflets are mildly thickened.  Mild posterior annular calcification.  Mild mitral regurgitation.  Trileaflet aortic valve.  No aortic stenosis or regurgitation.  Mild tricuspid regurgitation.  Estimated RVSP 18 mmHg.    EKG from today personally.  Discussed the patient shows sinus rhythm, poor R wave progression.    Assessment/Plan   Problem List Items Addressed This Visit        Circulatory    Cardiovascular risk factor     Melisa has a history of gestational hypertension, now hypertension, former tobacco use (although quit over 40 years ago),  impaired fasting glucose, and premature menopause.  Discussed increased risk of cardiovascular disease in women with hypertensive complications of pregnancy as well as impaired fasting glucose and premature menopause.   Very notably, she also received total body irradiation with a cumulative dose of 1500, cardiac exposure unknown.  Recent lipids from 2/11/20 reviewed and show acceptable levels with LDL measuring 114.  --Blood pressure management as above.  --Lifestyle modification discussed.  She maintains a healthy weight with heart healthy diet.   --Recommend coronary calcium score.  Reviewed risks and benefits including out-of-pocket cost.  She will think about it as she is understandably concerned about radiation exposure.         Relevant Orders    CT HEART CORONARY CALCIUM SCORE    Essential hypertension - Primary     Blood pressure acceptable on exam today, measuring 124/84 mmHg.  --Continue amlodipine 2.5 mg daily.  --Goal blood pressure less than 130/80 mmHg.  --Lifestyle modification discussed, particularly regular exercise.         Relevant Orders    ECG 12 lead (Completed)    Lipid panel    Comprehensive metabolic panel       Endocrine/Metabolic    IFG (impaired fasting  glucose)     Very notably, hemoglobin A1c measured 5.7% on 2/11/2020, consistent with impaired fasting glucose.  We discussed this finding at length.  It is certainly unexpected given normal body weight, healthy diet, and regular exercise.  She did lose approximately 3 to 4 pounds since the onset of the viral pandemic with quarantine.  Reviewed that this may drop her back into a normal range.  Encouraged continued heart healthy habits.  --Recommend repeat hemoglobin A1c.         Relevant Orders    Hemoglobin A1c             This letter was generated using speech recognition software. Please excuse any typographical errors.    Return in about 1 year (around 5/7/2022).        Saritha Nick MD, [facility]

## 2021-05-10 NOTE — ASSESSMENT & PLAN NOTE
Melisa has a history of gestational hypertension, now hypertension, former tobacco use (although quit over 40 years ago),  impaired fasting glucose, and premature menopause.  Discussed increased risk of cardiovascular disease in women with hypertensive complications of pregnancy as well as impaired fasting glucose and premature menopause.   Very notably, she also received total body irradiation with a cumulative dose of 1500, cardiac exposure unknown.  Recent lipids from 2/11/20 reviewed and show acceptable levels with LDL measuring 114.  --Blood pressure management as above.  --Lifestyle modification discussed.  She maintains a healthy weight with heart healthy diet.   --Recommend coronary calcium score.  Reviewed risks and benefits including out-of-pocket cost.  She will think about it as she is understandably concerned about radiation exposure.

## 2021-05-10 NOTE — ASSESSMENT & PLAN NOTE
Blood pressure acceptable on exam today, measuring 124/84 mmHg.  --Continue amlodipine 2.5 mg daily.  --Goal blood pressure less than 130/80 mmHg.  --Lifestyle modification discussed, particularly regular exercise.

## 2021-05-18 ENCOUNTER — LAB REQUISITION (OUTPATIENT)
Dept: LAB | Facility: HOSPITAL | Age: 66
End: 2021-05-18
Attending: INTERNAL MEDICINE
Payer: MEDICARE

## 2021-05-18 DIAGNOSIS — Z94.81 BONE MARROW TRANSPLANT STATUS (CMS/HCC): ICD-10-CM

## 2021-05-18 DIAGNOSIS — Z00.00 ENCOUNTER FOR GENERAL ADULT MEDICAL EXAMINATION WITHOUT ABNORMAL FINDINGS: ICD-10-CM

## 2021-05-18 LAB
ALBUMIN SERPL-MCNC: 4.6 G/DL (ref 3.4–5)
ALBUMIN/CREAT UR: 14.1 UG/MG
ALP SERPL-CCNC: 77 IU/L (ref 35–126)
ALT SERPL-CCNC: 25 IU/L (ref 11–54)
ANION GAP SERPL CALC-SCNC: 9 MEQ/L (ref 3–15)
AST SERPL-CCNC: 23 IU/L (ref 15–41)
BACTERIA URNS QL MICRO: ABNORMAL /HPF
BASOPHILS # BLD: 0.04 K/UL (ref 0.01–0.1)
BASOPHILS NFR BLD: 0.8 %
BILIRUB SERPL-MCNC: 0.7 MG/DL (ref 0.3–1.2)
BUN SERPL-MCNC: 13 MG/DL (ref 8–20)
CALCIUM SERPL-MCNC: 10.4 MG/DL (ref 8.9–10.3)
CHLORIDE SERPL-SCNC: 104 MEQ/L (ref 98–109)
CHOLEST SERPL-MCNC: 290 MG/DL
CO2 SERPL-SCNC: 28 MEQ/L (ref 22–32)
CREAT SERPL-MCNC: 1.1 MG/DL (ref 0.6–1.1)
CREAT UR-MCNC: 35.4 MG/DL
DIFFERENTIAL METHOD BLD: ABNORMAL
EOSINOPHIL # BLD: 0.07 K/UL (ref 0.04–0.36)
EOSINOPHIL NFR BLD: 1.4 %
ERYTHROCYTE [DISTWIDTH] IN BLOOD BY AUTOMATED COUNT: 13.2 % (ref 11.7–14.4)
EST. AVERAGE GLUCOSE BLD GHB EST-MCNC: 111 MG/DL
GFR SERPL CREATININE-BSD FRML MDRD: 49.5 ML/MIN/1.73M*2
GLUCOSE SERPL-MCNC: 99 MG/DL (ref 70–99)
HBA1C MFR BLD HPLC: 5.5 %
HCT VFR BLDCO AUTO: 41.6 % (ref 35–45)
HDLC SERPL-MCNC: 56 MG/DL
HDLC SERPL: 5.2 {RATIO}
HGB BLD-MCNC: 13.1 G/DL (ref 11.8–15.7)
HYALINE CASTS #/AREA URNS LPF: ABNORMAL /LPF
IMM GRANULOCYTES # BLD AUTO: 0.01 K/UL (ref 0–0.08)
IMM GRANULOCYTES NFR BLD AUTO: 0.2 %
LDLC SERPL CALC-MCNC: 178 MG/DL
LYMPHOCYTES # BLD: 2.45 K/UL (ref 1.2–3.5)
LYMPHOCYTES NFR BLD: 48.6 %
MCH RBC QN AUTO: 30.5 PG (ref 28–33.2)
MCHC RBC AUTO-ENTMCNC: 31.5 G/DL (ref 32.2–35.5)
MCV RBC AUTO: 97 FL (ref 83–98)
MICROALBUMIN UR-MCNC: 5 MG/L
MONOCYTES # BLD: 0.4 K/UL (ref 0.28–0.8)
MONOCYTES NFR BLD: 7.9 %
NEUTROPHILS # BLD: 2.07 K/UL (ref 1.7–7)
NEUTS SEG NFR BLD: 41.1 %
NONHDLC SERPL-MCNC: 234 MG/DL
NRBC BLD-RTO: 0 %
PDW BLD AUTO: 11.4 FL (ref 9.4–12.3)
PLATELET # BLD AUTO: 215 K/UL (ref 150–369)
POTASSIUM SERPL-SCNC: 5 MEQ/L (ref 3.6–5.1)
PROT SERPL-MCNC: 6.7 G/DL (ref 6–8.2)
RBC # BLD AUTO: 4.29 M/UL (ref 3.93–5.22)
RBC #/AREA URNS HPF: ABNORMAL /HPF
SODIUM SERPL-SCNC: 141 MEQ/L (ref 136–144)
SQUAMOUS URNS QL MICRO: 1 /HPF
TRIGL SERPL-MCNC: 279 MG/DL (ref 30–149)
TSH SERPL DL<=0.05 MIU/L-ACNC: 2.44 MIU/L (ref 0.34–5.6)
WBC # BLD AUTO: 5.04 K/UL (ref 3.8–10.5)
WBC #/AREA URNS HPF: ABNORMAL /HPF

## 2021-05-18 PROCEDURE — 80061 LIPID PANEL: CPT | Performed by: INTERNAL MEDICINE

## 2021-05-18 PROCEDURE — 80053 COMPREHEN METABOLIC PANEL: CPT | Performed by: INTERNAL MEDICINE

## 2021-05-18 PROCEDURE — 84443 ASSAY THYROID STIM HORMONE: CPT | Performed by: INTERNAL MEDICINE

## 2021-05-18 PROCEDURE — 83036 HEMOGLOBIN GLYCOSYLATED A1C: CPT | Performed by: INTERNAL MEDICINE

## 2021-05-18 PROCEDURE — 81015 MICROSCOPIC EXAM OF URINE: CPT | Performed by: INTERNAL MEDICINE

## 2021-05-18 PROCEDURE — 82570 ASSAY OF URINE CREATININE: CPT | Performed by: INTERNAL MEDICINE

## 2021-05-18 PROCEDURE — 82043 UR ALBUMIN QUANTITATIVE: CPT | Performed by: INTERNAL MEDICINE

## 2021-05-18 PROCEDURE — 85025 COMPLETE CBC W/AUTO DIFF WBC: CPT | Performed by: INTERNAL MEDICINE

## 2021-06-03 ENCOUNTER — OFFICE VISIT (OUTPATIENT)
Dept: GYNECOLOGY | Facility: CLINIC | Age: 66
End: 2021-06-03
Payer: MEDICARE

## 2021-06-03 VITALS — BODY MASS INDEX: 22.05 KG/M2 | HEIGHT: 68 IN

## 2021-06-03 DIAGNOSIS — Z01.419 ENCOUNTER FOR GYNECOLOGICAL EXAMINATION WITHOUT ABNORMAL FINDING: Primary | ICD-10-CM

## 2021-06-03 DIAGNOSIS — Z80.3 FAMILY HISTORY OF BREAST CANCER: ICD-10-CM

## 2021-06-03 PROCEDURE — G0101 CA SCREEN;PELVIC/BREAST EXAM: HCPCS | Mod: GA | Performed by: OBSTETRICS & GYNECOLOGY

## 2021-06-03 RX ORDER — AMITRIPTYLINE HYDROCHLORIDE 25 MG/1
25 TABLET, FILM COATED ORAL NIGHTLY
Qty: 90 TABLET | Refills: 3 | Status: SHIPPED | OUTPATIENT
Start: 2021-06-03 | End: 2022-06-06 | Stop reason: SDUPTHER

## 2021-06-03 RX ORDER — ROSUVASTATIN CALCIUM 10 MG/1
10 TABLET, COATED ORAL
COMMUNITY
Start: 2021-05-19 | End: 2023-08-28 | Stop reason: SDUPTHER

## 2021-06-03 RX ORDER — AMLODIPINE BESYLATE 2.5 MG/1
2.5 TABLET ORAL DAILY
Qty: 90 TABLET | Refills: 3 | Status: SHIPPED | OUTPATIENT
Start: 2021-06-03 | End: 2022-03-09

## 2021-06-03 NOTE — TELEPHONE ENCOUNTER
Medicine Refill Request    Last Office Visit: Visit date not found  Last Telemedicine Visit: Visit date not found    Pt req refill on amlodipine        Current Outpatient Medications:   •  amitriptyline (ELAVIL) 25 mg tablet, Take 1 tablet (25 mg total) by mouth nightly., Disp: 90 tablet, Rfl: 3  •  amLODIPine (NORVASC) 2.5 mg tablet, Take 1 tablet (2.5 mg total) by mouth daily., Disp: 90 tablet, Rfl: 3  •  ascorbate calcium (VITAMIN C ORAL), Take 1 tablet by mouth daily., Disp: , Rfl:   •  cholecalciferol, vitamin D3, (VITAMIN D3) 1,000 unit capsule, Take 1 capsule by mouth daily., Disp: , Rfl:   •  cyanocobalamin (vitamin B-12) 100 mcg tablet, Take 100 mcg by mouth daily., Disp: , Rfl:   •  rosuvastatin (CRESTOR) 10 mg tablet, Take 10 mg by mouth once daily., Disp: , Rfl:       BP Readings from Last 3 Encounters:   05/07/21 124/84   01/31/20 110/70   11/19/19 118/76       Recent Lab results:  Lab Results   Component Value Date    CHOL 290 (H) 05/18/2021   ,   Lab Results   Component Value Date    HDL 56 05/18/2021   ,   Lab Results   Component Value Date    LDLCALC 178 (H) 05/18/2021   ,   Lab Results   Component Value Date    TRIG 279 (H) 05/18/2021        Lab Results   Component Value Date    GLUCOSE 99 05/18/2021   ,   Lab Results   Component Value Date    HGBA1C 5.5 05/18/2021         Lab Results   Component Value Date    CREATININE 1.1 05/18/2021       Lab Results   Component Value Date    TSH 2.44 05/18/2021

## 2021-06-03 NOTE — PROGRESS NOTES
"Chief Complaint:  Annual      Last visit 2019    Pap neg/neg 2015    DEXA 2019 osteopenia low frax, has repeat scheduled.    Up to date with mammo.  Had polyps at last colonoscopy.    Same male partner using lubricant.  May consider estrogen.    Using amitriptyline for vulvodynia successfully.    No active complaints.        HPI:  6/3/2021      Melisa Wu is a 65 y.o. female who presents for annual exam. The patient has no complaints today. The patient is sexually active. GYN screening history: last pap: was normal. The patient has never been taking hormone replacement therapy. Patient denies post-menopausal vaginal bleeding.. The patient participates in regular exercise: yes.  The patient reports that there is not domestic violence in her life.    History of abnormal Pap smear: no  Family history of uterine or ovarian cancer: no  History of abnormal mammogram: no  Family history of breast cancer: yes - mgm and mat aunt    OB History: Menstrual History:  OB History        1    Para   1    Term   1            AB        Living   1       SAB        TAB        Ectopic        Multiple        Live Births   1           Obstetric Comments   + hbp, \"borderline\" diabetes            No LMP recorded. Patient is postmenopausal.         Medical History:   Past Medical History:   Diagnosis Date   • AML (acute myeloblastic leukemia) (CMS/HCC)    • B12 deficiency    • Colon polyp 2017   • Insomnia    • Leukopenia    • Squamous cell carcinoma     Right arm   • Thyroid nodule 2013    2mm nodule       Surgical History:   Past Surgical History:   Procedure Laterality Date   • BONE MARROW TRANSPLANT         Social History:   Social History     Socioeconomic History   • Marital status:      Spouse name: None   • Number of children: None   • Years of education: None   • Highest education level: None   Occupational History   • None   Tobacco Use   • Smoking status: Former Smoker   • Smokeless " tobacco: Never Used   • Tobacco comment: quit 40+ years ago   Substance and Sexual Activity   • Alcohol use: No   • Drug use: No   • Sexual activity: Yes     Partners: Male   Other Topics Concern   • None   Social History Narrative    Tobacco: Former smoker quit 40+ years ago, Alcohol: None, Occupation: Retired Exercise 3-4 times weekly      Social Determinants of Health     Financial Resource Strain:    • Difficulty of Paying Living Expenses:    Food Insecurity:    • Worried About Running Out of Food in the Last Year:    • Ran Out of Food in the Last Year:    Transportation Needs:    • Lack of Transportation (Medical):    • Lack of Transportation (Non-Medical):    Physical Activity:    • Days of Exercise per Week:    • Minutes of Exercise per Session:    Stress:    • Feeling of Stress :    Social Connections:    • Frequency of Communication with Friends and Family:    • Frequency of Social Gatherings with Friends and Family:    • Attends [employer] Services:    • Active Member of Clubs or Organizations:    • Attends Club or Organization Meetings:    • Marital Status:    Intimate Partner Violence:    • Fear of Current or Ex-Partner:    • Emotionally Abused:    • Physically Abused:    • Sexually Abused:        Family History:   Family History   Problem Relation Age of Onset   • Other Biological Father         Vascular   • Breast cancer Maternal Grandmother    • Cancer Maternal Grandfather    • Colon cancer Paternal Grandmother    • Prostate cancer Paternal Grandfather        Current Medications:   Current Outpatient Medications   Medication Sig Dispense Refill   • amitriptyline (ELAVIL) 25 mg tablet Take 1 tablet (25 mg total) by mouth nightly. 90 tablet 3   • amLODIPine (NORVASC) 2.5 mg tablet Take 1 tablet (2.5 mg total) by mouth daily. 90 tablet 3   • ascorbate calcium (VITAMIN C ORAL) Take 1 tablet by mouth daily.     • cholecalciferol, vitamin D3, (VITAMIN D3) 1,000 unit capsule Take 1 capsule by mouth daily.    "  • cyanocobalamin (vitamin B-12) 100 mcg tablet Take 100 mcg by mouth daily.     • rosuvastatin (CRESTOR) 10 mg tablet Take 10 mg by mouth once daily.       No current facility-administered medications for this visit.       Allergies: Patient has no known allergies.    Review of Systems  Constitutional: negative for weight loss  Gastrointestinal: negative for abdominal pain, incont  Genitourinary:positive for urgency min occ leak  Reproductive:no vaginal bleeding, she complains of vaginla dryness  Integument/breast: negative for breast lump and breast tenderness  Musculoskeletal:negative  Neurological: negative  Behavioral/Psych: negative for anxiety and depression  Endocrine: negative    Physical Exam  Visit Vitals  Ht 1.727 m (5' 8\")   BMI 22.05 kg/m²        General Appearance: Alert, cooperative, no acute distress  Head: Normocephalic, without obvious abnormality, atraumatic    Neck: no adenopathy  Nodes: no enlargement of axillary or supraclavicular nodes  Thyroid: no enlargement/tenderness/nodules    Breast: no masses, nontender and no discharge  Abdomen: Soft, nontender, nondistended,   no masses, no organomegaly  Back: kyphosisNo   Pelvic:     Vulva normal, Bartholin's, Urethra, [place]'s normal  Vagina normal mucosa  Cervixmultiparous appearance  Uterusnormal size, anteverted, mobile, non-tender  Adnexa no mass, fullness, tenderness  Rectal: mass No   Extremities: wnl  Musculoskeletal:wnl  Skin: Skin color, texture, turgor normal, no rashes or lesions    Neurologic:oriented and  memory intact  Psych:normal affect and behavior appropriate    Assessment & Plan    Problem List Items Addressed This Visit        Other    Family history of breast cancer    Overview     Mat aunt and mat gm. unknown if they had any mutations.    Needs to have tyrer audrey 8 done was less than 20 uin old model    With new tyrer risk is 13%    Daughter of the mat aunt had genetic testing which was neg           Other Visit Diagnoses  "    Encounter for gynecological examination without abnormal finding    -  Primary          Return in about 1 year (around 6/3/2022).  Fabiana Seay MD

## 2021-07-13 DIAGNOSIS — Z80.3 FAMILY HISTORY OF BREAST CANCER: ICD-10-CM

## 2021-07-13 DIAGNOSIS — Z12.31 SCREENING MAMMOGRAM, ENCOUNTER FOR: ICD-10-CM

## 2022-06-06 ENCOUNTER — TELEPHONE (OUTPATIENT)
Dept: GYNECOLOGY | Facility: CLINIC | Age: 67
End: 2022-06-06
Payer: MEDICARE

## 2022-06-06 RX ORDER — AMITRIPTYLINE HYDROCHLORIDE 25 MG/1
25 TABLET, FILM COATED ORAL NIGHTLY
Qty: 90 TABLET | Refills: 3 | Status: SHIPPED | OUTPATIENT
Start: 2022-06-06 | End: 2024-06-06

## 2022-06-06 NOTE — TELEPHONE ENCOUNTER
Dr. Seay,         Med refill request: amitriptyline (ELAVIL) 25 mg tablet, Take 1 tablet (25 mg total) by mouth nightly          [id number]  [place] - [address]  [phone]          Thank you

## 2022-06-08 ENCOUNTER — LAB REQUISITION (OUTPATIENT)
Dept: LAB | Facility: HOSPITAL | Age: 67
End: 2022-06-08
Attending: INTERNAL MEDICINE
Payer: MEDICARE

## 2022-06-08 DIAGNOSIS — I10 ESSENTIAL (PRIMARY) HYPERTENSION: ICD-10-CM

## 2022-06-08 DIAGNOSIS — Z00.00 ENCOUNTER FOR GENERAL ADULT MEDICAL EXAMINATION WITHOUT ABNORMAL FINDINGS: ICD-10-CM

## 2022-06-08 DIAGNOSIS — E78.2 MIXED HYPERLIPIDEMIA: ICD-10-CM

## 2022-06-08 DIAGNOSIS — E55.9 VITAMIN D DEFICIENCY, UNSPECIFIED: ICD-10-CM

## 2022-06-08 LAB
25(OH)D3 SERPL-MCNC: 60 NG/ML (ref 30–100)
ALBUMIN SERPL-MCNC: 4 G/DL (ref 3.4–5)
ALP SERPL-CCNC: 66 IU/L (ref 35–126)
ALT SERPL-CCNC: 16 IU/L (ref 11–54)
ANION GAP SERPL CALC-SCNC: 6 MEQ/L (ref 3–15)
AST SERPL-CCNC: 21 IU/L (ref 15–41)
BACTERIA URNS QL MICRO: ABNORMAL /HPF
BASOPHILS # BLD: 0.03 K/UL (ref 0.01–0.1)
BASOPHILS NFR BLD: 0.8 %
BILIRUB SERPL-MCNC: 0.4 MG/DL (ref 0.3–1.2)
BILIRUB UR QL STRIP.AUTO: NEGATIVE MG/DL
BUN SERPL-MCNC: 12 MG/DL (ref 8–20)
BURR CELLS BLD QL SMEAR: ABNORMAL
CALCIUM SERPL-MCNC: 10.4 MG/DL (ref 8.9–10.3)
CHLORIDE SERPL-SCNC: 110 MEQ/L (ref 98–109)
CHOLEST SERPL-MCNC: 147 MG/DL
CLARITY UR REFRACT.AUTO: CLEAR
CO2 SERPL-SCNC: 29 MEQ/L (ref 22–32)
COLOR UR AUTO: YELLOW
CREAT SERPL-MCNC: 1.2 MG/DL (ref 0.6–1.1)
DIFFERENTIAL METHOD BLD: ABNORMAL
EOSINOPHIL # BLD: 0.07 K/UL (ref 0.04–0.36)
EOSINOPHIL NFR BLD: 1.8 %
ERYTHROCYTE [DISTWIDTH] IN BLOOD BY AUTOMATED COUNT: 12.8 % (ref 11.7–14.4)
GFR SERPL CREATININE-BSD FRML MDRD: 44.7 ML/MIN/1.73M*2
GLUCOSE SERPL-MCNC: 100 MG/DL (ref 70–99)
GLUCOSE UR STRIP.AUTO-MCNC: NEGATIVE MG/DL
HCT VFR BLDCO AUTO: 39.4 % (ref 35–45)
HDLC SERPL-MCNC: 55 MG/DL
HDLC SERPL: 2.7 {RATIO}
HGB BLD-MCNC: 12.6 G/DL (ref 11.8–15.7)
HGB UR QL STRIP.AUTO: NEGATIVE
HYALINE CASTS #/AREA URNS LPF: ABNORMAL /LPF
IMM GRANULOCYTES # BLD AUTO: 0.01 K/UL (ref 0–0.08)
IMM GRANULOCYTES NFR BLD AUTO: 0.3 %
KETONES UR STRIP.AUTO-MCNC: NEGATIVE MG/DL
LDLC SERPL CALC-MCNC: 70 MG/DL
LEUKOCYTE ESTERASE UR QL STRIP.AUTO: 3
LYMPHOCYTES # BLD: 1.73 K/UL (ref 1.2–3.5)
LYMPHOCYTES NFR BLD: 45.2 %
MCH RBC QN AUTO: 29.9 PG (ref 28–33.2)
MCHC RBC AUTO-ENTMCNC: 32 G/DL (ref 32.2–35.5)
MCV RBC AUTO: 93.6 FL (ref 83–98)
MONOCYTES # BLD: 0.33 K/UL (ref 0.28–0.8)
MONOCYTES NFR BLD: 8.6 %
MUCOUS THREADS URNS QL MICRO: ABNORMAL /LPF
NEUTROPHILS # BLD: 1.66 K/UL (ref 1.7–7)
NEUTS SEG NFR BLD: 43.3 %
NITRITE UR QL STRIP.AUTO: NEGATIVE
NONHDLC SERPL-MCNC: 92 MG/DL
NRBC BLD-RTO: 0 %
OVALOCYTES BLD QL SMEAR: ABNORMAL
PDW BLD AUTO: 11.5 FL (ref 9.4–12.3)
PH UR STRIP.AUTO: 6.5 [PH]
PLATELET # BLD AUTO: 179 K/UL (ref 150–369)
PLATELET # BLD EST: ABNORMAL 10*3/UL
POTASSIUM SERPL-SCNC: 4.8 MEQ/L (ref 3.6–5.1)
PROT SERPL-MCNC: 5.8 G/DL (ref 6–8.2)
PROT UR QL STRIP.AUTO: ABNORMAL
RBC # BLD AUTO: 4.21 M/UL (ref 3.93–5.22)
RBC #/AREA URNS HPF: ABNORMAL /HPF
SODIUM SERPL-SCNC: 145 MEQ/L (ref 136–144)
SP GR UR REFRACT.AUTO: 1.01
SQUAMOUS URNS QL MICRO: 2 /HPF
TRANS CELLS URNS QL MICRO: 1 /HPF
TRIGL SERPL-MCNC: 109 MG/DL (ref 30–149)
UROBILINOGEN UR STRIP-ACNC: 0.2 EU/DL
WBC # BLD AUTO: 3.83 K/UL (ref 3.8–10.5)
WBC #/AREA URNS HPF: ABNORMAL /HPF

## 2022-06-08 PROCEDURE — 82306 VITAMIN D 25 HYDROXY: CPT | Performed by: INTERNAL MEDICINE

## 2022-06-08 PROCEDURE — 85025 COMPLETE CBC W/AUTO DIFF WBC: CPT | Performed by: INTERNAL MEDICINE

## 2022-06-08 PROCEDURE — 80061 LIPID PANEL: CPT | Performed by: INTERNAL MEDICINE

## 2022-06-08 PROCEDURE — 80053 COMPREHEN METABOLIC PANEL: CPT | Performed by: INTERNAL MEDICINE

## 2022-06-08 PROCEDURE — 81001 URINALYSIS AUTO W/SCOPE: CPT | Performed by: INTERNAL MEDICINE

## 2022-06-08 PROCEDURE — 81015 MICROSCOPIC EXAM OF URINE: CPT | Performed by: INTERNAL MEDICINE

## 2022-06-09 LAB
BACTERIA URNS QL MICRO: ABNORMAL /HPF
HYALINE CASTS #/AREA URNS LPF: ABNORMAL /LPF
MUCOUS THREADS URNS QL MICRO: ABNORMAL /LPF
RBC #/AREA URNS HPF: ABNORMAL /HPF
SQUAMOUS URNS QL MICRO: ABNORMAL /HPF
TRANS CELLS URNS QL MICRO: ABNORMAL /HPF
WBC #/AREA URNS HPF: ABNORMAL /HPF

## 2022-07-25 ENCOUNTER — TELEPHONE (OUTPATIENT)
Dept: GYNECOLOGY | Facility: CLINIC | Age: 67
End: 2022-07-25
Payer: MEDICARE

## 2022-07-25 NOTE — TELEPHONE ENCOUNTER
Noticed yesterday something felt unusual, looked and saw something, looked like skin and vessels, today it is gone. + constipation and straining, not new.    Needs visit, aware I am away for the next two weeks, would rather wait for me to rtn for eval. Can come 8/25, call if worsens

## 2022-07-25 NOTE — TELEPHONE ENCOUNTER
Nisreen Rudolph,      Pt states she feels that she has/had a mass coming out of her vag.  Pt would like a visit with you.      Please advise!

## 2022-08-25 ENCOUNTER — OFFICE VISIT (OUTPATIENT)
Dept: GYNECOLOGY | Facility: CLINIC | Age: 67
End: 2022-08-25
Payer: MEDICARE

## 2022-08-25 VITALS
BODY MASS INDEX: 22.88 KG/M2 | DIASTOLIC BLOOD PRESSURE: 82 MMHG | WEIGHT: 151 LBS | HEIGHT: 68 IN | SYSTOLIC BLOOD PRESSURE: 124 MMHG

## 2022-08-25 DIAGNOSIS — N81.6 RECTOCELE: ICD-10-CM

## 2022-08-25 DIAGNOSIS — N81.11 CYSTOCELE, MIDLINE: Primary | ICD-10-CM

## 2022-08-25 PROCEDURE — G8754 DIAS BP LESS 90: HCPCS | Performed by: OBSTETRICS & GYNECOLOGY

## 2022-08-25 PROCEDURE — 99214 OFFICE O/P EST MOD 30 MIN: CPT | Performed by: OBSTETRICS & GYNECOLOGY

## 2022-08-25 PROCEDURE — G8752 SYS BP LESS 140: HCPCS | Performed by: OBSTETRICS & GYNECOLOGY

## 2022-08-25 NOTE — PROGRESS NOTES
"  Subjective  ? Vaginal mass    Patient ID: Melisa Wu is a 66 y.o. female.    HPI \" something dropped\" something didn't feel right. Began end of July. Not worse, \" a point where it got better\". No change in constipation,no hesitancy, + urge incont, not new.  No change on activity but does lift heavy wgts    Review of Systems     Gyn: pelvic pressure, no bleeding    : no change in urge incont    GI: no change in constipation    Past Medical History:   Diagnosis Date   • AML (acute myeloblastic leukemia) (CMS/HCC) 1998   • B12 deficiency    • Colon polyp 11/2017   • Insomnia    • Leukopenia    • Squamous cell carcinoma     Right arm   • Thyroid nodule 07/2013    2mm nodule       Past Surgical History:   Procedure Laterality Date   • BONE MARROW TRANSPLANT  1998       Objective     Vitals:    08/25/22 1456   BP: 124/82   Weight: 68.5 kg (151 lb)   Height: 1.727 m (5' 8\")     Body mass index is 22.96 kg/m².    Physical Exam     Const: wnwd nad    Pelvic: nl vulva, rectocele seen at introitus, with valsalva cystocele to introitus, cx w/ some support but does have some descent to just below mid pelvis with valsalva, uterus and nl size, adnexa benign. + rectocele    Neuro: aaox3    Psych: appropriate mood and affect    Assessment/Plan     Problem List Items Addressed This Visit        Digestive    Rectocele       Genitourinary    Cystocele, midline - Primary        Problem List Items Addressed This Visit        Digestive    Rectocele    Overview     Asx, if elects surgical repair for cystocele, this would be repaired as well. To see Sarah              Genitourinary    Cystocele, midline - Primary    Overview     Symptomatic. Discussed pessary vs surgery, will refer to Dr Smith                   Return for to be determined.    Fabiana Seay MD      "

## 2022-09-14 ENCOUNTER — LAB REQUISITION (OUTPATIENT)
Dept: LAB | Facility: HOSPITAL | Age: 67
End: 2022-09-14
Attending: INTERNAL MEDICINE
Payer: MEDICARE

## 2022-09-14 DIAGNOSIS — R79.89 OTHER SPECIFIED ABNORMAL FINDINGS OF BLOOD CHEMISTRY: ICD-10-CM

## 2022-09-14 LAB
ANION GAP SERPL CALC-SCNC: 7 MEQ/L (ref 3–15)
BUN SERPL-MCNC: 11 MG/DL (ref 8–20)
CALCIUM SERPL-MCNC: 10 MG/DL (ref 8.9–10.3)
CHLORIDE SERPL-SCNC: 104 MEQ/L (ref 98–109)
CO2 SERPL-SCNC: 27 MEQ/L (ref 22–32)
CREAT SERPL-MCNC: 1.1 MG/DL (ref 0.6–1.1)
GFR SERPL CREATININE-BSD FRML MDRD: 49.4 ML/MIN/1.73M*2
GLUCOSE SERPL-MCNC: 97 MG/DL (ref 70–99)
POTASSIUM SERPL-SCNC: 4.7 MEQ/L (ref 3.6–5.1)
SODIUM SERPL-SCNC: 138 MEQ/L (ref 136–144)

## 2022-09-14 PROCEDURE — 82310 ASSAY OF CALCIUM: CPT | Performed by: INTERNAL MEDICINE

## 2022-09-14 PROCEDURE — 80048 BASIC METABOLIC PNL TOTAL CA: CPT | Performed by: INTERNAL MEDICINE

## 2022-09-22 ENCOUNTER — OFFICE VISIT (OUTPATIENT)
Dept: UROGYNECOLOGY | Facility: CLINIC | Age: 67
End: 2022-09-22
Payer: MEDICARE

## 2022-09-22 VITALS
WEIGHT: 151 LBS | SYSTOLIC BLOOD PRESSURE: 149 MMHG | BODY MASS INDEX: 22.88 KG/M2 | HEIGHT: 68 IN | DIASTOLIC BLOOD PRESSURE: 89 MMHG

## 2022-09-22 DIAGNOSIS — N39.3 STRESS INCONTINENCE: ICD-10-CM

## 2022-09-22 DIAGNOSIS — N81.2 UTEROVAGINAL PROLAPSE, INCOMPLETE: Primary | ICD-10-CM

## 2022-09-22 DIAGNOSIS — N39.41 URGE INCONTINENCE: ICD-10-CM

## 2022-09-22 PROCEDURE — G8754 DIAS BP LESS 90: HCPCS | Performed by: OBSTETRICS & GYNECOLOGY

## 2022-09-22 PROCEDURE — 99214 OFFICE O/P EST MOD 30 MIN: CPT | Mod: 25 | Performed by: OBSTETRICS & GYNECOLOGY

## 2022-09-22 PROCEDURE — G8753 SYS BP > OR = 140: HCPCS | Performed by: OBSTETRICS & GYNECOLOGY

## 2022-09-22 PROCEDURE — A4562 PESSARY, NON RUBBER,ANY TYPE: HCPCS | Performed by: OBSTETRICS & GYNECOLOGY

## 2022-09-22 PROCEDURE — 57160 INSERT PESSARY/OTHER DEVICE: CPT | Performed by: OBSTETRICS & GYNECOLOGY

## 2022-09-22 NOTE — PATIENT INSTRUCTIONS
If you have any problems or concerns, call our office at [phone].  If you think you are having a medical emergency, please call [phone].    If you have access to [employer] (the online patient portal), results from tests ordered at your visit (such as urine tests or ultrasounds) will appear in your portal as soon as they are ready. Please understand that you may see these results faster than we do. Please do not call about the results immediately - we will review your results and contact you after we have had time to analyze your tests.     Please also understand that we frequently do not receive messages sent to us by patients through [employer]. If you have questions or concerns, please call our office.

## 2022-09-22 NOTE — PROGRESS NOTES
"Ms. Wu is seen in consultation regarding pelvic organ prolapse at the request of Dr. Seay.     CC: \"Prolapsed bladder\"    HPI: This is a 66 y.o.  who presents to discuss pelvic organ prolapse. She first noted the prolapse in August.    Urinary Symptoms: She reports mixed urinary incontinence. She has never been on medications or treatments for urinary incontinence. She is more bothered by the urgency than the stress urinary incontinence.    She doesn't need pads everyday for protection. She denies nocturnal enuresis and awakens occasionally at night to void. She denies recurrent or frequent urinary tract infections, had no urinary tract infections in the last year, denies dysuria, and denies hematuria.    ICIQ-UI Short Form    1. How often do you leak urine? about once a week or less often (1)  2. How much urine do you usually leak? a small amount (2)  3. How much does leaking urine interfere with your everyday life? 0  4. When does urine leak (pick all that apply)? leaks before you can get to the toilet and leaks for no obvious reason     Prolapse Symptoms: She feels the bulge to the opening of the vagina, but finds it uncomfortable. She denies vaginal bleeding.    Bowel Symptoms: She has life-long intermittent constipation.  She denies splinting.  She denies anal incontinence.    Sexual Function: She is sexually active.     She  has a past medical history of AML (acute myeloblastic leukemia) (CMS/HCC) (), B12 deficiency, Colon polyp (2017), Insomnia, Leukopenia, Squamous cell carcinoma, and Thyroid nodule (2013).    She  has a past surgical history that includes Bone marrow transplant ().      Current Outpatient Medications:     amitriptyline (ELAVIL) 25 mg tablet, Take 1 tablet (25 mg total) by mouth nightly., Disp: 90 tablet, Rfl: 3    amLODIPine (NORVASC) 2.5 mg tablet, TAKE ONE TABLET BY MOUTH EVERY DAY, Disp: 90 tablet, Rfl: 1    cyanocobalamin (VITAMIN B12) 100 mcg tablet, Take " 100 mcg by mouth daily., Disp: , Rfl:     rosuvastatin (CRESTOR) 10 mg tablet, Take 10 mg by mouth once daily., Disp: , Rfl:     She has No Known Allergies.    Her social history is not contributory.    Her family history is not contributory.    Review of Systems    Review of Systems   Genitourinary: Positive for urgency.   All other systems reviewed and are negative.      Physical Exam    Vitals:    09/22/22 1455   BP: (!) 149/89     Weight: 68.5 kg (151 lb)   Body mass index is 22.96 kg/m².   Constitutional: She is oriented to person, place, and time and well-developed, well-nourished, and in no distress.  Psychiatric: Mood, memory, affect and judgment normal.   Neurological: Pleasant and oriented.   Neck: Normal in appearance.   Back: No tenderness over the spine or CVA tenderness.  Cardiovascular: No JVD.  No lower extremity edema.  Pulmonary/Chest: Effort normal. No respiratory distress.   Skin: Skin is warm and dry. No rash noted. No pallor.   Abd: Soft, non tender. No surgical scars visualized.    Pelvic Exam (a female chaperone was present during the entire pelvic examination)    Normal external genitalia, including clitoris, urethral meatus and perineal body.  MORGAN was not demonstrable with cough or Valsalva in the lithotomy position, approximately 1 hour after her last spontaneous void (she was unable to void in the office).    PROCEDURE NOTE FOR POST-VOID RESIDUAL: (41491)  The urethra was prepped, and a lubricated 12 [place]erratian catheter was inserted to collect a post void residual.  The procedure was well tolerated by the patient  The PVR amount is 180 mL.     POC UA was ordered: negative for heme, negative for LE, negative for nitrites detected.    Speculum examination: Vaginal epithelium was atrophic but without abnormal cysts or lesions.  The cervix was normal in appearance.  Bimanual exam: The uterus was small, mobile and nontender. There were no masses or tenderness in the pelvis/adnexal region.  Rectal  exam: Normally-situated anus.  SONYA deferred.    POP-Q:  Physical Exam  Genitourinary:   POP-Q measurements were:      Aa: +2, Ba: +2, C: -7     gH: 2.5/3.5, pB: 3, TVL: 10     Ap: 0, Bp: 0, D: -8           Assessment/Plan     Uterovaginal prolapse, incomplete  Ms. Wu has stage III A uterovaginal prolapse. I discussed multiple options, including expectant management, conservative management with pessary and/or pelvic floor PT, and surgical management. Surgically, I discussed transabdominal and transvaginal, as well as native-tissue vs mesh-augmented surgical approaches, but based on her vaginal topography, I would recommend a combined anterior and posterior colporrhaphy. She expressed a strong preference for conservative management, and she comfortably used a diaphragm for birth control when she was younger, so she opted to try a pessary. Melisa was fit with a #3 ring pessary with membrane (see Procedure Note). I asked her to return in 1 month for a pessary check, or sooner if clinically indicated.    Urge incontinence  Melisa notes occasional urgency urinary incontinence, but also reports that when she feels an urge to void, it is hard to defer. I am reassured that her post void residual was overall within normal limits (it was elevated, but she had not voided for approximately 1 hour, so I am comfortable that with a measured post void residual less than 200 mL, her post void residual was within normal limits). I have reviewed first line OAB therapy including caffeine reduction, fluid management, bladder retraining, and pelvic muscle exercises. I also explained to Melisa that the majority of women with anterior vaginal wall prolapse have lower urinary tract symptoms, such as urinary urgency and frequency, and of these women, the majority see improvement and/or resolution of their bladder symptoms with prolapse reduction via pessary or surgery.      Melisa notes that she has tried to do Kegel exercises for many  "years, but feels that they have not been effective. I therefore provided a structured program of pelvic exercises to help address her prolapse and lower urinary tract symptoms. I gave her the [employer] handout about pelvic exercises and bladder retraining to reinforce this program. If her symptoms are not improved at her return visit, we will consider a referral to pelvic floor physical therapy.    Stress incontinence  Ms. Wu has stress urinary incontinence by history. She has a negative urinalysis and her post void residual is within normal limits. Her stress urinary incontinence does not occur frequently and she is less bothered by it than her other symptoms. She has already tried behavioral modifications and pelvic exercises, without significant improvement. We discussed expectant management, conservative management, urethral bulking, and sling surgery. Patient education was provided regarding surgical and non-surgical options for stress urinary incontinence, including risks and complications. I explained the concept of \"urethral unkinking,\" such that prolapse reduction can worsen the severity of her stress urinary incontinence. If this happens, we can consider changing to a ring pessary with anti-incontinence knob, but I explained that these can be harder to remove and replace, so we agreed to start with a simple ring pessary with membrane.      Return in about 1 month (around 10/22/2022) for Pessary check.       Hudson Smith MD PhD  "

## 2022-09-22 NOTE — PROCEDURES
After a discussions of the options to treat her pelvic organ prolapse, Melisa elected to try a pessary.   Risks of pessary use including discomfort, bleeding, discharge, ulcers/erosions, fistulae of bowel/bladder, failure to fit successfully, failure to treat condition, and need for further treatment were reviewed.   After verbal consent obtained during this discussion, a pessary fitting was performed today as part of her visit:    The patient was fit with a # 3 ring pessary with membrane.  Education about care and management of the pessary was provided at today's visit.  She was also given the [employer] handout about pessaries.

## 2022-09-22 NOTE — ASSESSMENT & PLAN NOTE
Melisa notes occasional urgency urinary incontinence, but also reports that when she feels an urge to void, it is hard to defer. I am reassured that her post void residual was overall within normal limits (it was elevated, but she had not voided for approximately 1 hour, so I am comfortable that with a measured post void residual less than 200 mL, her post void residual was within normal limits). I have reviewed first line OAB therapy including caffeine reduction, fluid management, bladder retraining, and pelvic muscle exercises. I also explained to Melisa that the majority of women with anterior vaginal wall prolapse have lower urinary tract symptoms, such as urinary urgency and frequency, and of these women, the majority see improvement and/or resolution of their bladder symptoms with prolapse reduction via pessary or surgery.      Melisa notes that she has tried to do Kegel exercises for many years, but feels that they have not been effective. I therefore provided a structured program of pelvic exercises to help address her prolapse and lower urinary tract symptoms. I gave her the [employer] handout about pelvic exercises and bladder retraining to reinforce this program. If her symptoms are not improved at her return visit, we will consider a referral to pelvic floor physical therapy.

## 2022-09-22 NOTE — ASSESSMENT & PLAN NOTE
Ms. Wu has stage III A uterovaginal prolapse. I discussed multiple options, including expectant management, conservative management with pessary and/or pelvic floor PT, and surgical management. Surgically, I discussed transabdominal and transvaginal, as well as native-tissue vs mesh-augmented surgical approaches, but based on her vaginal topography, I would recommend a combined anterior and posterior colporrhaphy. She expressed a strong preference for conservative management, and she comfortably used a diaphragm for birth control when she was younger, so she opted to try a pessary. Melisa was fit with a #3 ring pessary with membrane (see Procedure Note). I asked her to return in 1 month for a pessary check, or sooner if clinically indicated.

## 2022-09-22 NOTE — ASSESSMENT & PLAN NOTE
"Ms. Wu has stress urinary incontinence by history. She has a negative urinalysis and her post void residual is within normal limits. Her stress urinary incontinence does not occur frequently and she is less bothered by it than her other symptoms. She has already tried behavioral modifications and pelvic exercises, without significant improvement. We discussed expectant management, conservative management, urethral bulking, and sling surgery. Patient education was provided regarding surgical and non-surgical options for stress urinary incontinence, including risks and complications. I explained the concept of \"urethral unkinking,\" such that prolapse reduction can worsen the severity of her stress urinary incontinence. If this happens, we can consider changing to a ring pessary with anti-incontinence knob, but I explained that these can be harder to remove and replace, so we agreed to start with a simple ring pessary with membrane.  "

## 2022-10-27 ENCOUNTER — OFFICE VISIT (OUTPATIENT)
Dept: UROGYNECOLOGY | Facility: CLINIC | Age: 67
End: 2022-10-27
Payer: MEDICARE

## 2022-10-27 VITALS
HEIGHT: 68 IN | SYSTOLIC BLOOD PRESSURE: 136 MMHG | WEIGHT: 151 LBS | DIASTOLIC BLOOD PRESSURE: 85 MMHG | BODY MASS INDEX: 22.88 KG/M2

## 2022-10-27 DIAGNOSIS — N30.00 ACUTE CYSTITIS WITHOUT HEMATURIA: ICD-10-CM

## 2022-10-27 DIAGNOSIS — N39.3 STRESS INCONTINENCE: ICD-10-CM

## 2022-10-27 DIAGNOSIS — Z46.89 ENCOUNTER FOR PESSARY MAINTENANCE: Primary | ICD-10-CM

## 2022-10-27 PROCEDURE — 87086 URINE CULTURE/COLONY COUNT: CPT | Performed by: OBSTETRICS & GYNECOLOGY

## 2022-10-27 PROCEDURE — G8752 SYS BP LESS 140: HCPCS | Performed by: OBSTETRICS & GYNECOLOGY

## 2022-10-27 PROCEDURE — 51701 INSERT BLADDER CATHETER: CPT | Performed by: OBSTETRICS & GYNECOLOGY

## 2022-10-27 PROCEDURE — 99214 OFFICE O/P EST MOD 30 MIN: CPT | Mod: 25 | Performed by: OBSTETRICS & GYNECOLOGY

## 2022-10-27 PROCEDURE — G8754 DIAS BP LESS 90: HCPCS | Performed by: OBSTETRICS & GYNECOLOGY

## 2022-10-27 NOTE — PROGRESS NOTES
Melisa Wu presents today for follow-up on pessary maintenance.    S: Ms. Wu presents today in scheduled follow-up on pessary maintenance. She was fit with a #3 ring pessary with membrane at her last visit to address incomplete uterovaginal prolapse.    Today, she reports that the pessary is working very well for maintaining her prolapse. She notes that the device is comfortable and keeps her prolapse reduced. She has not removed or replaced the pessary. She denies vaginal bleeding or excessive discharge.    Her main complaint is that she is having worse urinary incontinence since the pessary was inserted. She denies dysuria or feeling like a urinary tract infection. She simply notes that there is more urinary incontinence and she needed to buy pads for protection.    The following were reviewed today:   Allergies  Meds  Problems         O:   Vitals:    10/27/22 1356   BP: 136/85      Body mass index is 22.96 kg/m².  Gen: This is a well-appearing, well-nourished female in no apparent distress.   Pelvic exam (with a female chaperone present): Normal external genitalia   Stress urinary incontinence was not detected with cough or Valsalva  Point-of-care urinalysis on a clean-catch specimen: no heme, positive leukocyte esterase, no nitrites detected   PVR via cath: 20 mL  The catheterized urine was sent for culture.    The pessary was properly positioned within the vagina.  The pessary was removed digitally and washed with soap and water.  Speculum exam: No abrasions or ulcerations..  Bimanual exam: No masses  The pessary was lubricated and reinserted without difficulty.     Assessment/Plan     Encounter for pessary maintenance  Melisa is happy with conservative management of her uterovaginal prolapse via pessary. I reviewed care and maintenance of the device. I recommended Melisa return in 3 months for her next pessary check, or sooner if clinically indicated.    Stress incontinence  I again explained the  "concept of \"urethral unkinking,\" such that urinary incontinence worsens after prolapse reduction. I am reassured that the pessary is fitting well without obvious anterior vaginal wall descent on exam now, and her post void residual was within normal limits. I explained to Melisa that her urinary incontinence may improve as her pelvic floor muscle function returns to normal now that the muscles are constantly being stretched open by the prolapse. Conversely, if the leakage does not improve, we also have the option of switching to a ring pessary with anti-incontinence knob. We agreed to continue with the current ring with membrane, but if her urinary incontinence does not improve by her next visit, we can try fitting a ring pessary with membrane and knob.    Acute cystitis without hematuria  A point-of-care urinalysis on a clean-catch specimen showed the presence of leukocyte esterase. I therefore ordered a urine culture on a catheterized specimen. Furthermore, I reviewed her previous urine cultures in our system, with the following results:  Lab Results   Component Value Date    URINECX **Positive Culture** (A) 01/14/2021    URINECX >1 x 10^5 CFU/mL Proteus mirabilis 01/14/2021    URINECX **Positive Culture** (A) 01/31/2020    URINECX >1 x 10^5 CFU/mL Escherichia coli 01/31/2020    URINECX >1 x 10^5 CFU/mL Proteus mirabilis 01/31/2020       I will follow-up on the result of her urine studies, and will offer antibiotics and/or further evaluation, if appropriate. I explained that the leukocyte esterase is often due to contamination of her voided specimen with vaginal secretions, but the culture will not be so affected as it was obtained with a catheter.       Return in about 3 months (around 1/27/2023) for Pessary check.       Hudson Smith MD PhD  "

## 2022-10-27 NOTE — PROCEDURES
Procedure Note:  (62715) The urethra was prepped, and a lubricated 12 [place] catheter was inserted to collect a post void residual.  The procedure was well tolerated by the patient  The PVR amount is recorded (20 ml)

## 2022-10-27 NOTE — ASSESSMENT & PLAN NOTE
"I again explained the concept of \"urethral unkinking,\" such that urinary incontinence worsens after prolapse reduction. I am reassured that the pessary is fitting well without obvious anterior vaginal wall descent on exam now, and her post void residual was within normal limits. I explained to Melisa that her urinary incontinence may improve as her pelvic floor muscle function returns to normal now that the muscles are constantly being stretched open by the prolapse. Conversely, if the leakage does not improve, we also have the option of switching to a ring pessary with anti-incontinence knob. We agreed to continue with the current ring with membrane, but if her urinary incontinence does not improve by her next visit, we can try fitting a ring pessary with membrane and knob.  "

## 2022-10-27 NOTE — ASSESSMENT & PLAN NOTE
Melisa is happy with conservative management of her uterovaginal prolapse via pessary. I reviewed care and maintenance of the device. I recommended Melisa return in 3 months for her next pessary check, or sooner if clinically indicated.

## 2022-10-27 NOTE — ASSESSMENT & PLAN NOTE
A point-of-care urinalysis on a clean-catch specimen showed the presence of leukocyte esterase. I therefore ordered a urine culture on a catheterized specimen. Furthermore, I reviewed her previous urine cultures in our system, with the following results:  Lab Results   Component Value Date    URINECX **Positive Culture** (A) 01/14/2021    URINECX >1 x 10^5 CFU/mL Proteus mirabilis 01/14/2021    URINECX **Positive Culture** (A) 01/31/2020    URINECX >1 x 10^5 CFU/mL Escherichia coli 01/31/2020    URINECX >1 x 10^5 CFU/mL Proteus mirabilis 01/31/2020       I will follow-up on the result of her urine studies, and will offer antibiotics and/or further evaluation, if appropriate. I explained that the leukocyte esterase is often due to contamination of her voided specimen with vaginal secretions, but the culture will not be so affected as it was obtained with a catheter.

## 2022-10-29 LAB — BACTERIA UR CULT: NORMAL

## 2022-12-08 ENCOUNTER — OFFICE VISIT (OUTPATIENT)
Dept: CARDIOLOGY | Facility: CLINIC | Age: 67
End: 2022-12-08
Payer: MEDICARE

## 2022-12-08 VITALS
HEIGHT: 68 IN | DIASTOLIC BLOOD PRESSURE: 82 MMHG | OXYGEN SATURATION: 96 % | SYSTOLIC BLOOD PRESSURE: 126 MMHG | BODY MASS INDEX: 22.43 KG/M2 | HEART RATE: 67 BPM | WEIGHT: 148 LBS

## 2022-12-08 DIAGNOSIS — Z91.89 CARDIOVASCULAR RISK FACTOR: ICD-10-CM

## 2022-12-08 DIAGNOSIS — E78.2 MIXED HYPERLIPIDEMIA: ICD-10-CM

## 2022-12-08 DIAGNOSIS — I10 ESSENTIAL HYPERTENSION: Primary | ICD-10-CM

## 2022-12-08 PROCEDURE — 99214 OFFICE O/P EST MOD 30 MIN: CPT | Performed by: INTERNAL MEDICINE

## 2022-12-08 PROCEDURE — G8754 DIAS BP LESS 90: HCPCS | Performed by: INTERNAL MEDICINE

## 2022-12-08 PROCEDURE — 93000 ELECTROCARDIOGRAM COMPLETE: CPT | Performed by: INTERNAL MEDICINE

## 2022-12-08 PROCEDURE — G8752 SYS BP LESS 140: HCPCS | Performed by: INTERNAL MEDICINE

## 2022-12-08 RX ORDER — CALCIUM CARB/VITAMIN D3/VIT K1 500-500-40
TABLET,CHEWABLE ORAL
COMMUNITY
End: 2023-08-17

## 2022-12-08 NOTE — ASSESSMENT & PLAN NOTE
Blood pressure acceptable on exam today, measuring 126/82 mmHg.  --Continue amlodipine 2.5 mg daily.  --Goal blood pressure less than 130/80 mmHg.  --Lifestyle modification discussed, particularly regular exercise.

## 2022-12-08 NOTE — PROGRESS NOTES
" Saritha Nick MD, [facility]  Cardiology    [facility]    [facility]  [facility] [address]  [address] [place] [address]    TEL  [phone]  [facility]/ml     December 8, 2022    Reason for visit: Cardiovascular follow-up.    Melisa Wu is a 67 y.o. female who presents for cardiovascular follow-up. Kelin has a past medical history of hypertension, gestational hypertension, premature menopause, and prediabetes.  I last saw her in the office on May 7, 2021 at which time she was feeling well although not exercising as much as she should I did recommend a coronary calcium score for risk stratification.    Interval history obtained from the patient and extensive review of all available medical records.  She had lipids completed May 18, 2021 notable for an LDL of 178.  These are ordered by Dr. Park.  This is why rosuvastatin was started.  Last labs were completed in June 2022 showing a significant improvement in LDL to 70.    Today, Melisa returns for cardiovascular follow-up and reports feeling well.  She reports that today has been very stressful, as she had a main water break in her house last night.    She has decided against a calcium score. She feels that it has \"too many false positives\" and was advised against it by her  who also had this done.   She is without cardiovascular symptom or limitation. She has been exercising with heavy weight lifting with a  and does yoga. She does not enjoy cardio. She denies cardiovascular symptom limitation.  Has mild ankle swelling when she has too much salt. She is without chest pain, shortness of breath, orthopnea, PND, palpitations, dizziness, lightheadedness, or syncope.  She continues amlodipine 2.5 mg daily.      Past Medical History:  1.  Hypertension  2.  Acute myoblastic leukemia: 1998  3.  History of gestational hypertension  4.  Premature menopause  5.  Obstetric history: G1 "   6.  Leukopenia  7.  Insomnia    Past Surgical History:  1.  Bone marrow transplant: 1998  2.  Port placement: 1998    Medications: Rosuvastatin 10 mg daily, amlodipine 2.5 mg daily, amitriptyline 25 mg nightly, vitamin B12 100 mcg daily, vitamin D 1000 units daily, biotin, vitamin C.    Allergies: Patient has no known allergies.    Social History: , [place] Alber[place]. Former smoker, quit over 40 years ago.  Denies alcohol or illicit drug use.    Family History: Reviewed and noncontributory.    Exam:  Objective   Vitals:    12/08/22 1442   BP: 126/82   Pulse: 67   SpO2: 96%     Body mass index is 22.5 kg/m².  Physical Exam  Constitutional:       General: She is not in acute distress.     Appearance: She is well-developed.   HENT:      Head: Normocephalic.      Mouth/Throat:      Mouth: Mucous membranes are not cyanotic.   Eyes:      Conjunctiva/sclera: Conjunctivae normal.   Neck:      Vascular: No carotid bruit or JVD.   Cardiovascular:      Rate and Rhythm: Normal rate and regular rhythm.  No extrasystoles are present.     Pulses:           Carotid pulses are 2+ on the right side and 2+ on the left side.       Radial pulses are 2+ on the right side and 2+ on the left side.      Heart sounds: S1 normal and S2 normal. No murmur heard.    No gallop.   Pulmonary:      Effort: Pulmonary effort is normal. No respiratory distress.      Breath sounds: No wheezing or rales.   Abdominal:      General: Bowel sounds are normal.      Palpations: Abdomen is soft.      Tenderness: There is no abdominal tenderness.   Lymphadenopathy:      Cervical: No cervical adenopathy.   Skin:     General: Skin is warm and dry.   Neurological:      Mental Status: She is alert.   Psychiatric:         Speech: Speech normal.         Labs:  Lab Results   Component Value Date    WBC 3.83 06/08/2022    HGB 12.6 06/08/2022     06/08/2022    CHOL 147 06/08/2022    TRIG 109 06/08/2022    HDL 55 06/08/2022    ALT 16 06/08/2022    AST 21  06/08/2022     09/14/2022    K 4.7 09/14/2022    CREATININE 1.1 09/14/2022    TSH 2.44 05/18/2021    HGBA1C 5.5 05/18/2021    MICROALBUR 5.0 05/18/2021     Personally reviewed, my interpretation: LDL 70.  Normal hemoglobin A1c.  Normal renal and liver function.    Cardiovascular Studies:   1.  Echocardiogram 12/1/2016 (personally reviewed): Normal left ventricular cavity size and wall thickness with preserved systolic function, EF 60%.  No regional wall motion abnormalities.  False tendon.  Normal right ventricular size with preserved systolic function.  Normal biatrial size.  Mitral leaflets are mildly thickened.  Mild posterior annular calcification.  Mild mitral regurgitation.  Trileaflet aortic valve.  No aortic stenosis or regurgitation.  Mild tricuspid regurgitation.  Estimated RVSP 18 mmHg.    EKG from today personally.  Discussed the patient shows sinus rhythm, normal tracing.    Assessment/Plan   Problem List Items Addressed This Visit        Circulatory    Cardiovascular risk factor     Melisa has a history of gestational hypertension, now hypertension, hyperlipidemia, former tobacco use (although quit over 40 years ago),  impaired fasting glucose (subsequently resolved), and premature menopause.  Discussed increased risk of cardiovascular disease in women with hypertensive complications of pregnancy as well as impaired fasting glucose and premature menopause.   Very notably, she also received total body irradiation with a cumulative dose of 1500, cardiac exposure unknown.  --Recommend aggressive risk factor optimization.  --Lifestyle modification discussed.  She maintains a healthy weight with heart healthy diet.          Essential hypertension - Primary     Blood pressure acceptable on exam today, measuring 126/82 mmHg.  --Continue amlodipine 2.5 mg daily.  --Goal blood pressure less than 130/80 mmHg.  --Lifestyle modification discussed, particularly regular exercise.         Relevant Orders    ECG  12 lead (Completed)       Other    Mixed hyperlipidemia     From 6/8/2022: Total pressure 147, triglycerides 109, HDL 55, LDL 70.  Significantly improved LDL from a peak of 178 5/18/2021.  Estimated 10-year ASCVD risk elevated at 9.9%.  She declines coronary calcium score.  -- Continue high intensity statin: Rosuvastatin 10 mg daily.  -- Recommend target LDL less than 50-70 given risk factor profile.               This letter was generated using speech recognition software. Please excuse any typographical errors.    Return in about 1 year (around 12/8/2023).        Saritha Nick MD, FACC

## 2022-12-08 NOTE — LETTER
"December 8, 2022     Rocky Park MD  [address]  [address], [facility]  [place] [address]    Patient: Melisa Wu  YOB: 1955  Date of Visit: 12/8/2022      Dear Dr. Park:    Thank you for referring Melisa Wu to me for evaluation. Below are my notes for this consultation.    If you have questions, please do not hesitate to call me. I look forward to following your patient along with you.         Sincerely,        Saritha Nick MD        CC: No Recipients  Saritha Nick MD  12/8/2022  3:56 PM  Sign when Signing Visit   Saritha Nick MD, [facility]  Cardiology    [facility] HEART [facility]  [facility] Level  [address] [place] [address]    TEL  [phone]  [facility]     December 8, 2022    Reason for visit: Cardiovascular follow-up.    Melisa Wu is a 67 y.o. female who presents for cardiovascular follow-up. Kelin has a past medical history of hypertension, gestational hypertension, premature menopause, and prediabetes.  I last saw her in the office on May 7, 2021 at which time she was feeling well although not exercising as much as she should I did recommend a coronary calcium score for risk stratification.    Interval history obtained from the patient and extensive review of all available medical records.  She had lipids completed May 18, 2021 notable for an LDL of 178.  These are ordered by Dr. Park.  This is why rosuvastatin was started.  Last labs were completed in June 2022 showing a significant improvement in LDL to 70.    Today, Melisa returns for cardiovascular follow-up and reports feeling well.  She reports that today has been very stressful, as she had a main water break in her house last night.    She has decided against a calcium score. She feels that it has \"too many false positives\" and was advised against it by her  who also had this done.   She is without " cardiovascular symptom or limitation. She has been exercising with heavy weight lifting with a  and does yoga. She does not enjoy cardio. She denies cardiovascular symptom limitation.  Has mild ankle swelling when she has too much salt. She is without chest pain, shortness of breath, orthopnea, PND, palpitations, dizziness, lightheadedness, or syncope.  She continues amlodipine 2.5 mg daily.      Past Medical History:  1.  Hypertension  2.  Acute myoblastic leukemia:   3.  History of gestational hypertension  4.  Premature menopause  5.  Obstetric history:   6.  Leukopenia  7.  Insomnia    Past Surgical History:  1.  Bone marrow transplant:   2.  Port placement:     Medications: Rosuvastatin 10 mg daily, amlodipine 2.5 mg daily, amitriptyline 25 mg nightly, vitamin B12 100 mcg daily, vitamin D 1000 units daily, biotin, vitamin C.    Allergies: Patient has no known allergies.    Social History: , [place]. Former smoker, quit over 40 years ago.  Denies alcohol or illicit drug use.    Family History: Reviewed and noncontributory.    Exam:  Objective    Vitals:    22 1442   BP: 126/82   Pulse: 67   SpO2: 96%     Body mass index is 22.5 kg/m².  Physical Exam  Constitutional:       General: She is not in acute distress.     Appearance: She is well-developed.   HENT:      Head: Normocephalic.      Mouth/Throat:      Mouth: Mucous membranes are not cyanotic.   Eyes:      Conjunctiva/sclera: Conjunctivae normal.   Neck:      Vascular: No carotid bruit or JVD.   Cardiovascular:      Rate and Rhythm: Normal rate and regular rhythm.  No extrasystoles are present.     Pulses:           Carotid pulses are 2+ on the right side and 2+ on the left side.       Radial pulses are 2+ on the right side and 2+ on the left side.      Heart sounds: S1 normal and S2 normal. No murmur heard.    No gallop.   Pulmonary:      Effort: Pulmonary effort is normal. No respiratory distress.      Breath sounds:  No wheezing or rales.   Abdominal:      General: Bowel sounds are normal.      Palpations: Abdomen is soft.      Tenderness: There is no abdominal tenderness.   Lymphadenopathy:      Cervical: No cervical adenopathy.   Skin:     General: Skin is warm and dry.   Neurological:      Mental Status: She is alert.   Psychiatric:         Speech: Speech normal.         Labs:  Lab Results   Component Value Date    WBC 3.83 06/08/2022    HGB 12.6 06/08/2022     06/08/2022    CHOL 147 06/08/2022    TRIG 109 06/08/2022    HDL 55 06/08/2022    ALT 16 06/08/2022    AST 21 06/08/2022     09/14/2022    K 4.7 09/14/2022    CREATININE 1.1 09/14/2022    TSH 2.44 05/18/2021    HGBA1C 5.5 05/18/2021    MICROALBUR 5.0 05/18/2021     Personally reviewed, my interpretation: LDL 70.  Normal hemoglobin A1c.  Normal renal and liver function.    Cardiovascular Studies:   1.  Echocardiogram 12/1/2016 (personally reviewed): Normal left ventricular cavity size and wall thickness with preserved systolic function, EF 60%.  No regional wall motion abnormalities.  False tendon.  Normal right ventricular size with preserved systolic function.  Normal biatrial size.  Mitral leaflets are mildly thickened.  Mild posterior annular calcification.  Mild mitral regurgitation.  Trileaflet aortic valve.  No aortic stenosis or regurgitation.  Mild tricuspid regurgitation.  Estimated RVSP 18 mmHg.    EKG from today personally.  Discussed the patient shows sinus rhythm, normal tracing.    Assessment/Plan    Problem List Items Addressed This Visit        Circulatory    Cardiovascular risk factor     Melisa has a history of gestational hypertension, now hypertension, hyperlipidemia, former tobacco use (although quit over 40 years ago),  impaired fasting glucose (subsequently resolved), and premature menopause.  Discussed increased risk of cardiovascular disease in women with hypertensive complications of pregnancy as well as impaired fasting glucose  and premature menopause.   Very notably, she also received total body irradiation with a cumulative dose of 1500, cardiac exposure unknown.  --Recommend aggressive risk factor optimization.  --Lifestyle modification discussed.  She maintains a healthy weight with heart healthy diet.          Essential hypertension - Primary     Blood pressure acceptable on exam today, measuring 126/82 mmHg.  --Continue amlodipine 2.5 mg daily.  --Goal blood pressure less than 130/80 mmHg.  --Lifestyle modification discussed, particularly regular exercise.         Relevant Orders    ECG 12 lead (Completed)       Other    Mixed hyperlipidemia     From 6/8/2022: Total pressure 147, triglycerides 109, HDL 55, LDL 70.  Significantly improved LDL from a peak of 178 5/18/2021.  Estimated 10-year ASCVD risk elevated at 9.9%.  She declines coronary calcium score.  -- Continue high intensity statin: Rosuvastatin 10 mg daily.  -- Recommend target LDL less than 50-70 given risk factor profile.              This letter was generated using speech recognition software. Please excuse any typographical errors.    Return in about 1 year (around 12/8/2023).        Saritha Nick MD, FACC

## 2022-12-08 NOTE — ASSESSMENT & PLAN NOTE
From 6/8/2022: Total pressure 147, triglycerides 109, HDL 55, LDL 70.  Significantly improved LDL from a peak of 178 5/18/2021.  Estimated 10-year ASCVD risk elevated at 9.9%.  She declines coronary calcium score.  -- Continue high intensity statin: Rosuvastatin 10 mg daily.  -- Recommend target LDL less than 50-70 given risk factor profile.

## 2022-12-08 NOTE — ASSESSMENT & PLAN NOTE
Melisa has a history of gestational hypertension, now hypertension, hyperlipidemia, former tobacco use (although quit over 40 years ago),  impaired fasting glucose (subsequently resolved), and premature menopause.  Discussed increased risk of cardiovascular disease in women with hypertensive complications of pregnancy as well as impaired fasting glucose and premature menopause.   Very notably, she also received total body irradiation with a cumulative dose of 1500, cardiac exposure unknown.  --Recommend aggressive risk factor optimization.  --Lifestyle modification discussed.  She maintains a healthy weight with heart healthy diet.

## 2022-12-15 ENCOUNTER — TELEPHONE (OUTPATIENT)
Dept: UROGYNECOLOGY | Facility: CLINIC | Age: 67
End: 2022-12-15

## 2022-12-15 NOTE — TELEPHONE ENCOUNTER
Pt called had a pessary placed about 4 months ago states she has cloudly urine since just wants to make sure everything is ok she is going away

## 2023-02-09 ENCOUNTER — OFFICE VISIT (OUTPATIENT)
Dept: UROGYNECOLOGY | Facility: CLINIC | Age: 68
End: 2023-02-09
Payer: MEDICARE

## 2023-02-09 VITALS
SYSTOLIC BLOOD PRESSURE: 138 MMHG | WEIGHT: 148 LBS | DIASTOLIC BLOOD PRESSURE: 80 MMHG | BODY MASS INDEX: 22.43 KG/M2 | HEIGHT: 68 IN

## 2023-02-09 DIAGNOSIS — N30.00 ACUTE CYSTITIS WITHOUT HEMATURIA: ICD-10-CM

## 2023-02-09 DIAGNOSIS — Z46.89 ENCOUNTER FOR PESSARY MAINTENANCE: Primary | ICD-10-CM

## 2023-02-09 DIAGNOSIS — R82.90 ABNORMAL URINALYSIS: ICD-10-CM

## 2023-02-09 LAB
BLOOD URINE, POC: NEGATIVE
EXPIRATION DATE: ABNORMAL
LEUKOCYTE EST, POC: ABNORMAL
Lab: ABNORMAL
NITRITE, POC: ABNORMAL
POCT MANUFACTURER: ABNORMAL

## 2023-02-09 PROCEDURE — G8754 DIAS BP LESS 90: HCPCS | Performed by: OBSTETRICS & GYNECOLOGY

## 2023-02-09 PROCEDURE — 87186 SC STD MICRODIL/AGAR DIL: CPT | Performed by: OBSTETRICS & GYNECOLOGY

## 2023-02-09 PROCEDURE — G8752 SYS BP LESS 140: HCPCS | Performed by: OBSTETRICS & GYNECOLOGY

## 2023-02-09 PROCEDURE — 81002 URINALYSIS NONAUTO W/O SCOPE: CPT | Performed by: OBSTETRICS & GYNECOLOGY

## 2023-02-09 PROCEDURE — 99214 OFFICE O/P EST MOD 30 MIN: CPT | Mod: 25 | Performed by: OBSTETRICS & GYNECOLOGY

## 2023-02-09 PROCEDURE — 51701 INSERT BLADDER CATHETER: CPT | Performed by: OBSTETRICS & GYNECOLOGY

## 2023-02-09 PROCEDURE — 87086 URINE CULTURE/COLONY COUNT: CPT | Performed by: OBSTETRICS & GYNECOLOGY

## 2023-02-09 NOTE — PATIENT INSTRUCTIONS
If you have any problems or concerns, call our office at [phone].  If you think you are having a medical emergency, please call [phone].    If you have access to [facility] (the online patient portal), results from tests ordered at your visit (such as urine tests or ultrasounds) will appear in your portal as soon as they are ready. Please understand that you may see these results faster than we do. Please do not call about the results immediately - we will review your results and contact you after we have had time to analyze your tests.     Please also understand that we frequently do not receive messages sent to us by patients through [facility]. If you have questions or concerns, please call our office.

## 2023-02-09 NOTE — PROCEDURES
Procedure Note:  (06979) The urethra was prepped, and a lubricated 12 Maltese catheter was inserted to collect a post void residual.  The procedure was well tolerated by the patient  The PVR amount is recorded (10 ml)

## 2023-02-09 NOTE — PROGRESS NOTES
Melisa Wu presents today for follow-up on pessary maintenance.    S: Ms. Wu presents today in scheduled follow-up on pessary maintenance. She is using a #3 ring pessary with membrane for conservative management of incomplete uterovaginal prolapse.    Today, Melisa reports that she continues to do well with the pessary. She has not removed or replaced the ring on her own. She denies vaginal pain or bleeding. The pessary keeps her prolapse well-supported.    She denies dysuria, flank pain or fevers. However, she notes that her urine seems cloudy in the morning, but then clears up as the day progresses.    The following were reviewed today:   Allergies  Meds  Problems         O:   Vitals:    02/09/23 1446   BP: 138/80      Body mass index is 22.5 kg/m².  Gen: This is a well-appearing, well-nourished female in no apparent distress.   Back: No costovertebral angle tenderness bilaterally    Pelvic exam (with a female chaperone present): Normal external genitalia     Point-of-care urinalysis on a clean-catch specimen: no heme, positive leukocyte esterase, positive nitrites detected   Post void residual via catheterization: 10 mL   The catheterized urine was sent for culture.    The pessary was properly positioned within the vagina.  The pessary was removed digitally and washed with soap and water.  Speculum exam: No abrasions or ulcerations.  Bimanual exam: No masses  The pessary was lubricated and reinserted without difficulty.     Assessment/Plan     Encounter for pessary maintenance  Melisa remains comfortable with conservative management of her prolapse using a pessary. We again discussed the need for regular pessary checks given the risk of abrasions, ulcerations or fistula formation with long-term use of pessaries. She will therefore follow-up in 3 months for her next pessary check, or sooner if clinically indicated.    Acute cystitis without hematuria  A point-of-care urinalysis on a clean-catch  specimen showed the presence of leukocyte esterase and nitrites. I therefore ordered a urine culture on a catheterized specimen. Furthermore, I reviewed her previous urine cultures in our system, with the following results:  Lab Results   Component Value Date    URINECX No Growth (Limit of detection 100 cfu/mL) 10/27/2022    URINECX **Positive Culture** (A) 01/14/2021    URINECX >1 x 10^5 CFU/mL Proteus mirabilis 01/14/2021    URINECX **Positive Culture** (A) 01/31/2020    URINECX >1 x 10^5 CFU/mL Escherichia coli 01/31/2020    URINECX >1 x 10^5 CFU/mL Proteus mirabilis 01/31/2020       I will follow-up on the result of her urine studies, and will offer antibiotics and/or further evaluation, if appropriate.  In the meantime, warnings about signs and symptoms of pyelonephritis and/or sepsis were provided.        Return in about 3 months (around 5/9/2023) for Pessary check.       Hudson Smith MD PhD

## 2023-02-09 NOTE — ASSESSMENT & PLAN NOTE
A point-of-care urinalysis on a clean-catch specimen showed the presence of leukocyte esterase and nitrites. I therefore ordered a urine culture on a catheterized specimen. Furthermore, I reviewed her previous urine cultures in our system, with the following results:  Lab Results   Component Value Date    URINECX No Growth (Limit of detection 100 cfu/mL) 10/27/2022    URINECX **Positive Culture** (A) 01/14/2021    URINECX >1 x 10^5 CFU/mL Proteus mirabilis 01/14/2021    URINECX **Positive Culture** (A) 01/31/2020    URINECX >1 x 10^5 CFU/mL Escherichia coli 01/31/2020    URINECX >1 x 10^5 CFU/mL Proteus mirabilis 01/31/2020       I will follow-up on the result of her urine studies, and will offer antibiotics and/or further evaluation, if appropriate.  In the meantime, warnings about signs and symptoms of pyelonephritis and/or sepsis were provided.

## 2023-02-09 NOTE — ASSESSMENT & PLAN NOTE
Melisa remains comfortable with conservative management of her prolapse using a pessary. We again discussed the need for regular pessary checks given the risk of abrasions, ulcerations or fistula formation with long-term use of pessaries. She will therefore follow-up in 3 months for her next pessary check, or sooner if clinically indicated.

## 2023-02-11 LAB
BACTERIA UR CULT: ABNORMAL
BACTERIA UR CULT: ABNORMAL

## 2023-02-13 ENCOUNTER — TELEPHONE (OUTPATIENT)
Dept: UROGYNECOLOGY | Facility: CLINIC | Age: 68
End: 2023-02-13
Payer: MEDICARE

## 2023-02-13 RX ORDER — CEFUROXIME AXETIL 250 MG/1
250 TABLET ORAL 2 TIMES DAILY
Qty: 14 TABLET | Refills: 0 | Status: SHIPPED | OUTPATIENT
Start: 2023-02-13 | End: 2023-02-20

## 2023-02-13 NOTE — TELEPHONE ENCOUNTER
I left a voicemail to let Ms. Wu know that her recent urine culture shows she has a UTI.  I sent a prescription for Ceftin 250 mg, Si tab PO BID x 7 days to her pharmacy ([facility] in [place]).    Hudson Smith MD PhD

## 2023-03-07 ENCOUNTER — TELEPHONE (OUTPATIENT)
Dept: UROGYNECOLOGY | Facility: CLINIC | Age: 68
End: 2023-03-07

## 2023-03-07 NOTE — TELEPHONE ENCOUNTER
FYI::  Pt called c/o UTI sx.  I advised PT she would need an OV & offered her appt today at [place], Pt declined.  Pt not happy that you are not in [facility] full time, pt wants a physician available when she needs & if she knew this before she wouldn't have come.

## 2023-05-11 ENCOUNTER — OFFICE VISIT (OUTPATIENT)
Dept: UROGYNECOLOGY | Facility: CLINIC | Age: 68
End: 2023-05-11
Payer: MEDICARE

## 2023-05-11 VITALS
HEIGHT: 68 IN | DIASTOLIC BLOOD PRESSURE: 92 MMHG | BODY MASS INDEX: 22.43 KG/M2 | WEIGHT: 148 LBS | SYSTOLIC BLOOD PRESSURE: 158 MMHG

## 2023-05-11 DIAGNOSIS — K59.09 CHRONIC CONSTIPATION: ICD-10-CM

## 2023-05-11 DIAGNOSIS — N39.3 STRESS INCONTINENCE: ICD-10-CM

## 2023-05-11 DIAGNOSIS — N81.2 UTEROVAGINAL PROLAPSE, INCOMPLETE: Primary | ICD-10-CM

## 2023-05-11 DIAGNOSIS — N39.41 URGE INCONTINENCE: ICD-10-CM

## 2023-05-11 LAB
BLOOD URINE, POC: NEGATIVE
EXPIRATION DATE: NORMAL
LEUKOCYTE EST, POC: NEGATIVE
Lab: NORMAL
NITRITE, POC: NEGATIVE
POCT MANUFACTURER: NORMAL

## 2023-05-11 PROCEDURE — 99214 OFFICE O/P EST MOD 30 MIN: CPT | Mod: 25 | Performed by: OBSTETRICS & GYNECOLOGY

## 2023-05-11 PROCEDURE — 57160 INSERT PESSARY/OTHER DEVICE: CPT | Performed by: OBSTETRICS & GYNECOLOGY

## 2023-05-11 PROCEDURE — G8753 SYS BP > OR = 140: HCPCS | Performed by: OBSTETRICS & GYNECOLOGY

## 2023-05-11 PROCEDURE — 81002 URINALYSIS NONAUTO W/O SCOPE: CPT | Performed by: OBSTETRICS & GYNECOLOGY

## 2023-05-11 PROCEDURE — G8755 DIAS BP > OR = 90: HCPCS | Performed by: OBSTETRICS & GYNECOLOGY

## 2023-05-11 PROCEDURE — A4562 PESSARY, NON RUBBER,ANY TYPE: HCPCS | Performed by: OBSTETRICS & GYNECOLOGY

## 2023-05-11 NOTE — PROCEDURES
After a discussions of the options to treat her urinary incontinence and pelvic organ prolapse, Melisa elected to try a different pessary.   Risks of pessary use including discomfort, bleeding, discharge, ulcers/erosions, fistulae of bowel/bladder, failure to fit successfully, failure to treat condition, and need for further treatment were reviewed.   After verbal consent obtained during this discussion, a pessary fitting was performed today as part of her visit:    The patient was fit with a # 3 ring pessary with membrane and with an anti-incontinence knob.  Education about care and management of the pessary was provided at today's visit.

## 2023-05-11 NOTE — PROGRESS NOTES
"Melisa Wu presents today for follow-up on pessary maintenance.    S: Ms. Wu presents today in scheduled follow-up on pessary maintenance. She has a #3 ring with membrane to keep incomplete uterovaginal prolapse reduced.    Today, she reports that she's not sure if the pessary is still fitting well, as sometimes \"everything feels lower than it should be.\" She has not removed or replaced the pessary herself. She denies any vaginal bleeding or discharge. She can't feel any tissue beyond the vaginal opening, but it seems close.    She also notes that both her stress and urgency urinary incontinence are worse. She therefore has been reducing her fluid intake to try to compensate. She denies dysuria, flank pain, fevers, or gross hematuria.    The following were reviewed today:   Allergies  Meds  Problems         O:   Vitals:    05/11/23 1434   BP: (!) 158/92      Body mass index is 22.5 kg/m².  Gen: This is a well-appearing, well-nourished female in no apparent distress.   Pelvic exam (with a female chaperone present): Normal external genitalia     Point-of-care urinalysis on a clean-catch specimen: no heme, no leukocyte esterase, no nitrites detected     The pessary was properly positioned within the vagina and it felt like a good fit based on digital exam at rest and with Valsalva.  The pessary was removed digitally and washed with soap and water.  Speculum exam: No abrasions or ulcerations..  Bimanual exam: No masses. A large amount of firm stool was palpable transvaginally in the rectum.  I fit Melisa with a new, #3 ring pessary with membrane and knob (see Procedure Note).    Assessment/Plan     Uterovaginal prolapse, incomplete  Melisa has stage III a uterovaginal prolapse. She previously had been happy with a #3 ring pessary with membrane for conservative management, but recently notes that she intermittently feels like everything is lower. I reassured her that the #3 ring size looks and feels like " the appropriate fit, and I wonder if her constipation is leading to these sensations. She will continue to come in for pessary checks every 3 months as she does not remove or replace the pessary herself.    Urge incontinence  Melisa notes that recently her urgency urinary incontinence seems to be worse. I am reassured that her urinalysis was unremarkable today. I explained to Melisa about the concept of bowel-bladder cross-talk, and how constipation can therefore lead to more severe lower urinary tract symptoms. I therefore encouraged a good bowel regimen with fiber, laxatives, stool softeners, etc., to keep the stool soft and moving easily to minimize exacerbation of bladder symptoms.     Stress incontinence  I explained to Melisa how extra sub-urethral/bladder support can improve stress urinary incontinence, whether that comes from a surgically implanted sling or an anti-incontinence knob on a ring pessary. We therefore agreed to a trial of a pessary meant to treat both prolapse and urinary incontinence, and she was fit with a #3 ring pessary with membrane and knob today.    Chronic constipation  Melisa reports a long history of constipation, despite eating a very high fiber diet. I explained that I can feel a large amount of hard stool on exam today, and that this may be contributing to her worsening pelvic floor symptoms. I therefore recommended she try adding MiraLAX to her bowel regimen, and I put written information about PEG 3350 in her after visit summary. She notes that she tried using MiraLAX in the past, but has had difficulty finding a dose that leads to less constipation without excessive stooling. I therefore gave advice about how to dose the medication and how to slowly make changes to the amount of MiraLAX used to try to titrate to the appropriate effect.      Return in about 3 months (around 8/11/2023) for Pessary check.     I spent 30 minutes on this date of service performing the following  activities: obtaining history, performing examination, documenting and providing counseling and education.     Hudson Smith MD PhD

## 2023-05-11 NOTE — ASSESSMENT & PLAN NOTE
Melisa notes that recently her urgency urinary incontinence seems to be worse. I am reassured that her urinalysis was unremarkable today. I explained to Melisa about the concept of bowel-bladder cross-talk, and how constipation can therefore lead to more severe lower urinary tract symptoms. I therefore encouraged a good bowel regimen with fiber, laxatives, stool softeners, etc., to keep the stool soft and moving easily to minimize exacerbation of bladder symptoms.

## 2023-05-11 NOTE — PROGRESS NOTES
Pt here for pessary cleaning, #3 ring with membrane.    C/o worsening leakage. Urge and stress incontinence.    UA dip: negative

## 2023-05-11 NOTE — ASSESSMENT & PLAN NOTE
I explained to Melisa how extra sub-urethral/bladder support can improve stress urinary incontinence, whether that comes from a surgically implanted sling or an anti-incontinence knob on a ring pessary. We therefore agreed to a trial of a pessary meant to treat both prolapse and urinary incontinence, and she was fit with a #3 ring pessary with membrane and knob today.

## 2023-05-11 NOTE — ASSESSMENT & PLAN NOTE
Melisa has stage III a uterovaginal prolapse. She previously had been happy with a #3 ring pessary with membrane for conservative management, but recently notes that she intermittently feels like everything is lower. I reassured her that the #3 ring size looks and feels like the appropriate fit, and I wonder if her constipation is leading to these sensations. She will continue to come in for pessary checks every 3 months as she does not remove or replace the pessary herself.

## 2023-05-11 NOTE — PATIENT INSTRUCTIONS
If you have any problems or concerns, call our office at [phone].  If you think you are having a medical emergency, please call [phone].    If you have access to [facility] (the online patient portal), results from tests ordered at your visit (such as urine tests or ultrasounds) will appear in your portal as soon as they are ready. Please understand that you may see these results faster than we do. Please do not call about the results immediately - we will review your results and contact you after we have had time to analyze your tests.     Please also understand that we frequently do not receive messages sent to us by patients through [facility]. If you have questions or concerns, please call our office.

## 2023-05-11 NOTE — ASSESSMENT & PLAN NOTE
Melisa reports a long history of constipation, despite eating a very high fiber diet. I explained that I can feel a large amount of hard stool on exam today, and that this may be contributing to her worsening pelvic floor symptoms. I therefore recommended she try adding MiraLAX to her bowel regimen, and I put written information about PEG 3350 in her after visit summary. She notes that she tried using MiraLAX in the past, but has had difficulty finding a dose that leads to less constipation without excessive stooling. I therefore gave advice about how to dose the medication and how to slowly make changes to the amount of MiraLAX used to try to titrate to the appropriate effect.

## 2023-08-17 ENCOUNTER — OFFICE VISIT (OUTPATIENT)
Dept: UROGYNECOLOGY | Facility: CLINIC | Age: 68
End: 2023-08-17
Payer: MEDICARE

## 2023-08-17 VITALS
BODY MASS INDEX: 22.43 KG/M2 | HEIGHT: 68 IN | DIASTOLIC BLOOD PRESSURE: 80 MMHG | SYSTOLIC BLOOD PRESSURE: 146 MMHG | WEIGHT: 148 LBS

## 2023-08-17 DIAGNOSIS — Z46.89 ENCOUNTER FOR PESSARY MAINTENANCE: Primary | ICD-10-CM

## 2023-08-17 PROCEDURE — G8753 SYS BP > OR = 140: HCPCS | Performed by: OBSTETRICS & GYNECOLOGY

## 2023-08-17 PROCEDURE — G8754 DIAS BP LESS 90: HCPCS | Performed by: OBSTETRICS & GYNECOLOGY

## 2023-08-17 PROCEDURE — 99213 OFFICE O/P EST LOW 20 MIN: CPT | Performed by: OBSTETRICS & GYNECOLOGY

## 2023-08-17 NOTE — PROGRESS NOTES
Melisa Wu presents today for follow-up on pessary maintenance.    S: Ms. Wu presents today in scheduled follow-up on pessary maintenance. At her last visit, she was fit with a #3 ring pessary with membrane and anti-incontinence knob to allow for conservative management of both incomplete uterovaginal prolapse and mixed urinary incontinence.    Today, Melisa reports that the pessary is working well. It fits comfortably and keeps her prolapse reduced. The addition of the knob has led to much improved urinary incontinence. She denies pain or vaginal bleeding. She has not removed the pessary on her own.    The following were reviewed today:   Allergies  Meds  Problems         O:   Vitals:    08/17/23 1356   BP: (!) 146/80      Body mass index is 22.5 kg/m².  Gen: This is a well-appearing, well-nourished female in no apparent distress.   Pelvic exam (with a female chaperone present): Normal external genitalia     Point-of-care urinalysis on a clean-catch specimen: no heme, no leukocyte esterase, no nitrites detected     The pessary was properly positioned within the vagina with the knob facing outward.  The pessary was removed digitally and washed with soap and water.  Speculum exam: No abrasions or ulcerations.  Bimanual exam: No masses  The pessary was lubricated and reinserted without difficulty.     Assessment/Plan     Encounter for pessary maintenance  Melisa remains happy with the pessary and using a pessary with an anti-incontinence knob has helped reduce her urinary incontinence. I am reassured that her vaginal epithelium appeared healthy without abrasions or ulcerations, so I put her pessary back in today and I asked her to return in 3 months for her next pessary check, or sooner if clinically indicated.      Return in about 3 months (around 11/17/2023) for Pessary check.       Hudson Smith MD PhD

## 2023-08-17 NOTE — PATIENT INSTRUCTIONS
If you have any problems or concerns, call our office at [phone].  If you think you are having a medical emergency, please call [phone].    If you have access to [employer] (the online patient portal), results from tests ordered at your visit (such as urine tests or ultrasounds) will appear in your portal as soon as they are ready. Please understand that you may see these results faster than we do. Please do not call about the results immediately - we will review your results and contact you after we have had time to analyze your tests.     Please also understand that we frequently do not receive messages sent to us by patients through [employer]. If you have questions or concerns, please call our office.

## 2023-08-17 NOTE — ASSESSMENT & PLAN NOTE
Melisa remains happy with the pessary and using a pessary with an anti-incontinence knob has helped reduce her urinary incontinence. I am reassured that her vaginal epithelium appeared healthy without abrasions or ulcerations, so I put her pessary back in today and I asked her to return in 3 months for her next pessary check, or sooner if clinically indicated.

## 2023-08-28 ENCOUNTER — TELEPHONE (OUTPATIENT)
Dept: SCHEDULING | Facility: CLINIC | Age: 68
End: 2023-08-28

## 2023-08-28 ENCOUNTER — TELEPHONE (OUTPATIENT)
Dept: CARDIOLOGY | Facility: CLINIC | Age: 68
End: 2023-08-28
Payer: MEDICARE

## 2023-08-28 RX ORDER — ROSUVASTATIN CALCIUM 10 MG/1
10 TABLET, COATED ORAL
Qty: 90 TABLET | Refills: 1 | Status: SHIPPED | OUTPATIENT
Start: 2023-08-28 | End: 2023-12-29

## 2023-08-28 NOTE — TELEPHONE ENCOUNTER
Pt called said she lost her Rosuvasatin and pharmacy wont refill her meds. Pt req. Refill for Rosuvasatin 10mg to be sent to [facility]     P; [phone]

## 2023-08-31 ENCOUNTER — TELEPHONE (OUTPATIENT)
Dept: SCHEDULING | Facility: CLINIC | Age: 68
End: 2023-08-31
Payer: MEDICARE

## 2023-08-31 RX ORDER — AMLODIPINE BESYLATE 2.5 MG/1
2.5 TABLET ORAL DAILY
Qty: 90 TABLET | Refills: 1 | Status: SHIPPED | OUTPATIENT
Start: 2023-08-31 | End: 2024-02-09 | Stop reason: SDUPTHER

## 2023-11-16 ENCOUNTER — OFFICE VISIT (OUTPATIENT)
Dept: UROGYNECOLOGY | Facility: CLINIC | Age: 68
End: 2023-11-16
Payer: MEDICARE

## 2023-11-16 VITALS
SYSTOLIC BLOOD PRESSURE: 160 MMHG | HEIGHT: 68 IN | DIASTOLIC BLOOD PRESSURE: 90 MMHG | WEIGHT: 148 LBS | BODY MASS INDEX: 22.43 KG/M2

## 2023-11-16 DIAGNOSIS — Z46.89 ENCOUNTER FOR PESSARY MAINTENANCE: Primary | ICD-10-CM

## 2023-11-16 PROCEDURE — G8755 DIAS BP > OR = 90: HCPCS | Performed by: OBSTETRICS & GYNECOLOGY

## 2023-11-16 PROCEDURE — 99213 OFFICE O/P EST LOW 20 MIN: CPT | Performed by: OBSTETRICS & GYNECOLOGY

## 2023-11-16 PROCEDURE — G8753 SYS BP > OR = 140: HCPCS | Performed by: OBSTETRICS & GYNECOLOGY

## 2023-11-16 NOTE — ASSESSMENT & PLAN NOTE
Melisa remains happy with the control she gets over her pelvic floor disorders from the #3 ring pessary with membrane and knob. I am reassured that her vaginal tissues appeared healthy today. I therefore was comfortable reinserting Melisa's pessary today, and I asked her to return for her next pessary check in 3 months, or sooner if clinically indicated.

## 2023-11-16 NOTE — PROGRESS NOTES
Melisa Wu presents today for follow-up on pessary maintenance.    S: Ms. Wu presents today in scheduled follow-up on pessary maintenance. She uses a #3 ring pessary with membrane and knob.    Today, Melisa reports that the pessary continues to work well. She does not remove or replace the ring. The device keeps her prolapse reduced. She denies vaginal bleeding or pain. She has occasional urinary incontinence, but much less than when she was using a ring without a knob.    The following were reviewed today:   Allergies  Meds  Problems         O:   Vitals:    11/16/23 1419   BP: (!) 160/90      Body mass index is 22.5 kg/m².  Gen: This is a well-appearing, well-nourished female in no apparent distress.   Pelvic exam (with a female chaperone present): Normal external genitalia     Point-of-care urinalysis on a clean-catch specimen: no heme, no leukocyte esterase, no nitrites detected     The pessary was properly positioned within the vagina.  The pessary was removed digitally and washed with soap and water.  Speculum exam: No abrasions or ulcerations.  The pessary was lubricated and reinserted without difficulty.     Assessment/Plan     Encounter for pessary maintenance  Melisa remains happy with the control she gets over her pelvic floor disorders from the #3 ring pessary with membrane and knob. I am reassured that her vaginal tissues appeared healthy today. I therefore was comfortable reinserting Melisa's pessary today, and I asked her to return for her next pessary check in 3 months, or sooner if clinically indicated.       Return in about 3 months (around 2/16/2024) for Pessary check.       Hudson Smith MD PhD

## 2023-11-16 NOTE — PATIENT INSTRUCTIONS
If you have any problems or concerns, call our office at [phone].  If you think you are having a medical emergency, please call [phone].    If you have access to [employer] (the online patient portal), results from tests ordered at your visit (such as urine tests or ultrasounds) will appear in your portal as soon as they are ready. Please understand that you may see these results faster than we do. Please do not call about the results immediately - we will review your results and contact you after we have had time to analyze your tests.     Please also understand that we frequently do not receive messages sent to us by patients through [employer]. If you have questions or concerns, please call our office.

## 2023-12-13 PROBLEM — Z79.899 ENCOUNTER FOR MONITORING CARDIOTOXIC DRUG THERAPY: Status: ACTIVE | Noted: 2023-12-13

## 2023-12-13 PROBLEM — Z51.81 ENCOUNTER FOR MONITORING CARDIOTOXIC DRUG THERAPY: Status: ACTIVE | Noted: 2023-12-13

## 2023-12-13 NOTE — PROGRESS NOTES
" Saritha Nick MD, [facility]  Cardiology    [facility]    [facility]  [facility] [address]  [address] [place] [address]    TEL  [phone]  [facility]     2023    Reason for visit: Cardiovascular follow-up.    Melisa Wu is a 68 y.o. female who presents for cardiovascular follow-up. Kelin has a past medical history of hypertension, gestational hypertension, premature menopause, and prediabetes.  I last saw her in the office on 2022 at which time she was feeling well, although high levels of stress.  She had decided against a coronary calcium score.    Interval history obtained from the patient and extensive review of all available medical records.  She has been followed by Dr. Smith for pessary maintenance.  Blood pressure is often elevated in his office, measuring 160/90 mmHg on 2023, 146/80 mmHg on 2023, 158/92 mmHg on May 11, 2023, and 138/80 mmHg on 2023.    Today, Melisa returns for cardiovascular follow-up and reports feeling well.   She has been exercising with weight lifting with a  and does yoga. She denies cardiovascular symptom or limitation.    She recently saw Dr. Park with \"perfect\"blood pressure.   She had labs recently with Dr. Park.  She does have occasional edema with travelling and walking in warmer weather. She declines PRN diuretics.   She is without chest pain, shortness of breath, orthopnea, PND, palpitations, dizziness, lightheadedness, or syncope.  She continues amlodipine 2.5 mg daily.      Past Medical History:  1.  Hypertension  2.  Acute myoblastic leukemia:   3.  History of gestational hypertension  4.  Premature menopause  5.  Obstetric history:   6.  Leukopenia  7.  Insomnia    Past Surgical History:  1.  Bone marrow transplant:   2.  Port placement:     Medications: Rosuvastatin 10 mg daily, amlodipine 2.5 mg daily, " amitriptyline 25 mg nightly, vitamin B12 100 mcg daily, vitamin D 1000 units daily, biotin, vitamin C.    Allergies: Patient has no known allergies.    Social History: , [place] Alberto. Former smoker, quit over 40 years ago.  Denies alcohol or illicit drug use.    Family History: Reviewed and noncontributory.    Exam:  Objective   Vitals:    12/14/23 1435   BP: 126/82   Pulse: (!) 52   Resp: 18   SpO2: 99%     Body mass index is 22.81 kg/m².  Physical Exam  Constitutional:       General: She is not in acute distress.  Cardiovascular:      Rate and Rhythm: Normal rate and regular rhythm.      Heart sounds: Normal heart sounds. No murmur heard.  Pulmonary:      Effort: Pulmonary effort is normal. No respiratory distress.      Breath sounds: Normal breath sounds.   Musculoskeletal:      Right lower leg: No edema.      Left lower leg: No edema.   Skin:     General: Skin is warm and dry.   Neurological:      Mental Status: She is alert.         Labs:  Lab Results   Component Value Date    WBC 3.83 06/08/2022    HGB 12.6 06/08/2022     06/08/2022    CHOL 147 06/08/2022    TRIG 109 06/08/2022    HDL 55 06/08/2022    ALT 16 06/08/2022    AST 21 06/08/2022     09/14/2022    K 4.7 09/14/2022    CREATININE 1.1 09/14/2022    TSH 2.44 05/18/2021    HGBA1C 5.5 05/18/2021    MICROALBUR 5.0 05/18/2021     Personally reviewed, my interpretation: LDL 70.  Normal hemoglobin A1c.  Normal renal and liver function.    Cardiovascular Studies:   1.  Echocardiogram 12/1/2016 (personally reviewed): Normal left ventricular cavity size and wall thickness with preserved systolic function, EF 60%.  No regional wall motion abnormalities.  False tendon.  Normal right ventricular size with preserved systolic function.  Normal biatrial size.  Mitral leaflets are mildly thickened.  Mild posterior annular calcification.  Mild mitral regurgitation.  Trileaflet aortic valve.  No aortic stenosis or regurgitation.  Mild tricuspid  regurgitation.  Estimated RVSP 18 mmHg.      Assessment/Plan   Problem List Items Addressed This Visit        Circulatory    Essential hypertension - Primary     Blood pressure acceptable on exam today, measuring 126/82 mmHg.  --Continue amlodipine 2.5 mg daily.  --Goal blood pressure less than 130/80 mmHg.  --Lifestyle modification discussed, particularly regular exercise.            Other    Mixed hyperlipidemia     From 6/8/2022: Total pressure 147, triglycerides 109, HDL 55, LDL 70.  Significantly improved LDL from a peak of 178 5/18/2021.  Estimated 10-year ASCVD risk elevated at 9.9%.  She declines coronary calcium score.  -- Continue high intensity statin: Rosuvastatin 10 mg daily.  --We will request her recent labs from Dr. Park.  -- Recommend target LDL less than 50-70 given risk factor profile.         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  We do not have records regarding chemotherapy, however treatment often begins with intensive remission induction chemotherapy which includes anthracycline.  She underwent stem cell transplant.  She also received total body irradiation with a cumulative dose of 1500, cardiac exposure unknown.  --Plan to monitor for late cardiac toxicity with echocardiogram every 5 years.  --Recommend aggressive cardiovascular risk factor optimization.  --Lifestyle modification discussed.  She maintains a healthy weight with heart healthy diet.          Relevant Orders    Transthoracic echo (TTE) complete          This letter was generated using speech recognition software. Please excuse any typographical errors.    Return in about 1 year (around 12/14/2024).        Saritha Nick MD, [facility]

## 2023-12-13 NOTE — ASSESSMENT & PLAN NOTE
From 6/8/2022: Total pressure 147, triglycerides 109, HDL 55, LDL 70.  Significantly improved LDL from a peak of 178 5/18/2021.  Estimated 10-year ASCVD risk elevated at 9.9%.  She declines coronary calcium score.  -- Continue high intensity statin: Rosuvastatin 10 mg daily.  --We will request her recent labs from Dr. Park.  -- Recommend target LDL less than 50-70 given risk factor profile.

## 2023-12-13 NOTE — ASSESSMENT & PLAN NOTE
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  We do not have records regarding chemotherapy, however treatment often begins with intensive remission induction chemotherapy which includes anthracycline.  She underwent stem cell transplant.  She also received total body irradiation with a cumulative dose of 1500, cardiac exposure unknown.  --Plan to monitor for late cardiac toxicity with echocardiogram every 5 years.  --Recommend aggressive cardiovascular risk factor optimization.  --Lifestyle modification discussed.  She maintains a healthy weight with heart healthy diet.

## 2023-12-14 ENCOUNTER — OFFICE VISIT (OUTPATIENT)
Dept: CARDIOLOGY | Facility: CLINIC | Age: 68
End: 2023-12-14
Payer: MEDICARE

## 2023-12-14 VITALS
HEIGHT: 68 IN | RESPIRATION RATE: 18 BRPM | HEART RATE: 52 BPM | WEIGHT: 150 LBS | DIASTOLIC BLOOD PRESSURE: 82 MMHG | OXYGEN SATURATION: 99 % | SYSTOLIC BLOOD PRESSURE: 126 MMHG | BODY MASS INDEX: 22.73 KG/M2

## 2023-12-14 DIAGNOSIS — Z79.899 ENCOUNTER FOR MONITORING CARDIOTOXIC DRUG THERAPY: ICD-10-CM

## 2023-12-14 DIAGNOSIS — I10 ESSENTIAL HYPERTENSION: Primary | ICD-10-CM

## 2023-12-14 DIAGNOSIS — Z51.81 ENCOUNTER FOR MONITORING CARDIOTOXIC DRUG THERAPY: ICD-10-CM

## 2023-12-14 DIAGNOSIS — E78.2 MIXED HYPERLIPIDEMIA: ICD-10-CM

## 2023-12-14 PROCEDURE — G8754 DIAS BP LESS 90: HCPCS | Performed by: INTERNAL MEDICINE

## 2023-12-14 PROCEDURE — 99214 OFFICE O/P EST MOD 30 MIN: CPT | Performed by: INTERNAL MEDICINE

## 2023-12-14 PROCEDURE — G8752 SYS BP LESS 140: HCPCS | Performed by: INTERNAL MEDICINE

## 2023-12-14 RX ORDER — ACETAMINOPHEN 500 MG
TABLET ORAL
COMMUNITY
End: 2025-03-25

## 2024-02-09 RX ORDER — AMLODIPINE BESYLATE 2.5 MG/1
2.5 TABLET ORAL DAILY
Qty: 90 TABLET | Refills: 3 | Status: SHIPPED | OUTPATIENT
Start: 2024-02-09

## 2024-02-09 NOTE — TELEPHONE ENCOUNTER
Medicine Refill Request [facility]    Name of Patient: Melisa Wu    Caller's name/Relationship: Melisa Wu    Callback number: [phone]    Medication Name, Dosage, Supply: amLODIPine (NORVASC) 2.5 mg tablet    Quantity left: few    Pharmacy: [facility]    Last Office Visit: 12.14.23  Last Consult Visit: Visit date not found  Last Telemedicine Visit: Visit date not found    Next Appointment: Visit date not found      Current Outpatient Medications:   •  amitriptyline (ELAVIL) 25 mg tablet, Take 1 tablet (25 mg total) by mouth nightly., Disp: 90 tablet, Rfl: 3  •  amLODIPine (NORVASC) 2.5 mg tablet, Take 1 tablet (2.5 mg total) by mouth daily., Disp: 90 tablet, Rfl: 1  •  cholecalciferol, vitamin D3, 50 mcg (2,000 unit) capsule, 1 capsule Orally Once a day, Disp: , Rfl:   •  cyanocobalamin (VITAMIN B12) 100 mcg tablet, Take 100 mcg by mouth daily., Disp: , Rfl:   •  rosuvastatin (CRESTOR) 10 mg tablet, TAKE ONE TABLET BY MOUTH EVERY DAY, Disp: 90 tablet, Rfl: 1      BP Readings from Last 3 Encounters:   12/14/23 126/82   11/16/23 (!) 160/90   08/17/23 (!) 146/80       Recent Lab results:  Lab Results   Component Value Date    CHOL 147 06/08/2022   ,   Lab Results   Component Value Date    HDL 55 06/08/2022   ,   Lab Results   Component Value Date    LDLCALC 70 06/08/2022   ,   Lab Results   Component Value Date    TRIG 109 06/08/2022        Lab Results   Component Value Date    GLUCOSE 97 09/14/2022   ,   Lab Results   Component Value Date    HGBA1C 5.5 05/18/2021         Lab Results   Component Value Date    CREATININE 1.1 09/14/2022       Lab Results   Component Value Date    TSH 2.44 05/18/2021

## 2024-03-07 ENCOUNTER — OFFICE VISIT (OUTPATIENT)
Dept: UROGYNECOLOGY | Facility: CLINIC | Age: 69
End: 2024-03-07
Payer: MEDICARE

## 2024-03-07 VITALS
HEIGHT: 68 IN | WEIGHT: 150 LBS | DIASTOLIC BLOOD PRESSURE: 87 MMHG | BODY MASS INDEX: 22.73 KG/M2 | SYSTOLIC BLOOD PRESSURE: 146 MMHG

## 2024-03-07 DIAGNOSIS — Z46.89 ENCOUNTER FOR PESSARY MAINTENANCE: Primary | ICD-10-CM

## 2024-03-07 PROCEDURE — G8754 DIAS BP LESS 90: HCPCS | Performed by: OBSTETRICS & GYNECOLOGY

## 2024-03-07 PROCEDURE — 99459 PELVIC EXAMINATION: CPT | Performed by: OBSTETRICS & GYNECOLOGY

## 2024-03-07 PROCEDURE — G8753 SYS BP > OR = 140: HCPCS | Performed by: OBSTETRICS & GYNECOLOGY

## 2024-03-07 PROCEDURE — 99213 OFFICE O/P EST LOW 20 MIN: CPT | Performed by: OBSTETRICS & GYNECOLOGY

## 2024-03-07 NOTE — PROGRESS NOTES
Melisa Wu presents today for follow-up on pessary maintenance.    S: Ms. Wu presents today in scheduled follow-up on pessary maintenance. She has a #3 ring pessary with membrane and anti-incontinence knob.    She notes that she had one episode where the pessary slipped down, but didn't fall out. She digitally pushed the pessary back up, and it returned to working as before (other than feeling a little funny for the first few days). Other than this one episode, the pessary has continued to work well to fit comfortably and keep her prolapse reduced. She denies any significant urinary incontinence. She denies vaginal bleeding or pain. She is not removing the pessary on her own.    The following were reviewed today:   Allergies  Meds  Problems         O:   Vitals:    03/07/24 1350   BP: (!) 146/87      Body mass index is 22.81 kg/m².  Gen: This is a well-appearing, well-nourished female in no apparent distress.   Pelvic exam (with a female chaperone present): Normal external genitalia     Point-of-care urinalysis on a clean-catch specimen: no heme, no leukocyte esterase, no nitrites detected     The pessary was properly positioned within the vagina.  The pessary was removed digitally and washed with soap and water.  Speculum exam: No abrasions or ulcerations. Her vaginal epithelium and cervix were atrophic but otherwise normal in appearance.  Bimanual exam: Her uterus was small, anteverted, mobile, and nontender. There were no palpable adnexal masses noted.  The pessary was lubricated and reinserted without difficulty.     Assessment/Plan     Encounter for pessary maintenance  I reassured eMlisa that her current pessary looks and feels like the proper size. I am reassured that her tissues appear healthy without vaginal abrasions or ulcerations. Long-term, if she continues to have episodes of the pessary slipping down, we can consider fitting her with a larger device, but I continue to think her #3 ring  pessary is the proper size. I asked her to return in 3 months, or sooner if clinically indicated.      Return in about 3 months (around 6/7/2024) for Pessary check.       Hudson Smith MD PhD

## 2024-03-07 NOTE — PATIENT INSTRUCTIONS
If you have any problems or concerns, call our office at [phone].  If you think you are having a medical emergency, please call [phone].    If you have access to [employer] (the online patient portal), results from tests ordered at your visit (such as urine tests or ultrasounds) will appear in your portal as soon as they are ready. Please understand that you may see these results faster than we do. Please do not call about the results immediately - we will review your results and contact you after we have had time to analyze your tests.     Please also understand that we frequently do not receive messages sent to us by patients through [employer]. If you have questions or concerns, please call our office.

## 2024-03-07 NOTE — ASSESSMENT & PLAN NOTE
I reassured Melisa that her current pessary looks and feels like the proper size. I am reassured that her tissues appear healthy without vaginal abrasions or ulcerations. Long-term, if she continues to have episodes of the pessary slipping down, we can consider fitting her with a larger device, but I continue to think her #3 ring pessary is the proper size. I asked her to return in 3 months, or sooner if clinically indicated.

## 2024-03-07 NOTE — PROGRESS NOTES
Pt here for pessary cleaning, #3 ring with membrane and knob. Felt it coming out once and pushed it back in.    UA dip: negative

## 2024-05-02 ENCOUNTER — HOSPITAL ENCOUNTER (OUTPATIENT)
Dept: CARDIOLOGY | Facility: HOSPITAL | Age: 69
Discharge: HOME | End: 2024-05-02
Attending: INTERNAL MEDICINE
Payer: MEDICARE

## 2024-05-02 VITALS
SYSTOLIC BLOOD PRESSURE: 126 MMHG | HEART RATE: 58 BPM | WEIGHT: 149.91 LBS | HEIGHT: 68 IN | DIASTOLIC BLOOD PRESSURE: 74 MMHG | BODY MASS INDEX: 22.72 KG/M2

## 2024-05-02 DIAGNOSIS — Z79.899 ENCOUNTER FOR MONITORING CARDIOTOXIC DRUG THERAPY: ICD-10-CM

## 2024-05-02 DIAGNOSIS — Z51.81 ENCOUNTER FOR MONITORING CARDIOTOXIC DRUG THERAPY: ICD-10-CM

## 2024-05-02 PROCEDURE — 93306 TTE W/DOPPLER COMPLETE: CPT

## 2024-05-02 PROCEDURE — 93306 TTE W/DOPPLER COMPLETE: CPT | Mod: 26 | Performed by: INTERNAL MEDICINE

## 2024-05-06 LAB
AORTIC ROOT ANNULUS - M-MODE: 3.1 CM
AORTIC ROOT ANNULUS - M-MODE: 3.1 CM
AORTIC VALVE MEAN VELOCITY: 0.82 M/S
AORTIC VALVE VELOCITY TIME INTEGRAL: 31.4 CM
ASCENDING AORTA: 3.3 CM
AV MEAN GRADIENT: 3 MMHG
AV PEAK GRADIENT: 6 MMHG
AV PEAK VELOCITY-S: 1.18 M/S
AV VALVE AREA INDEX: 1.26
AV VALVE AREA: 2.12 CM2
AV VELOCITY RATIO: 0.66
AVA (VTI): 2.27 CM2
BSA FOR ECHO PROCEDURE: 1.81 M2
CUSP SEPARATION: 2.1 CM
DOP CALC LVOT STROKE VOLUME: 71.31 CM3
E WAVE DECELERATION TIME: 379 MS
E/A RATIO: 1
E/E' RATIO: 10.7
E/LAT E' RATIO: 8.1
EDV (BP): 60.5 CM3
EDV (MM-TEICH): 97.8 CM3
EF (A4C): 66.2 %
EF (MM-TEICH): 69.7 %
EF A2C: 61.2 %
EJECTION FRACTION: 63.6 %
EST RIGHT VENT SYSTOLIC PRESSURE BY TRICUSPID REGURGITATION JET: 20 MMHG
ESV (BP): 22 CM3
ESV (MM-TEICH): 29.6 CM3
FRACTIONAL SHORTENING: 38.64 %
INTERVENTRICULAR SEPTUM: 1 CM
LA ESV (BP): 41.7 CM3
LA ESV INDEX (A2C): 21.49 CM3/M2
LA ESV INDEX (BP): 23.04 CM3/M2
LAAS-AP2: 15.3 CM2
LAAS-AP4: 13.4 CM2
LAD 2D: 3.4 CM
LALD A4C: 3.93 CM
LALD A4C: 4.72 CM
LAV-S: 38.9 CM3
LEFT ATRIUM VOLUME INDEX: 20.88 CM3/M2
LEFT ATRIUM VOLUME: 37.8 CM3
LEFT INTERNAL DIMENSION IN SYSTOLE: 2.7 CM (ref 2.52–3.81)
LEFT VENTRICLE DIASTOLIC VOLUME INDEX: 33.98 CM3/M2
LEFT VENTRICLE DIASTOLIC VOLUME: 61.5 CM3
LEFT VENTRICLE SYSTOLIC VOLUME INDEX: 11.44 CM3/M2
LEFT VENTRICLE SYSTOLIC VOLUME: 20.7 CM3
LEFT VENTRICULAR INTERNAL DIMENSION IN DIASTOLE: 4.4 CM (ref 4.25–5.9)
LEFT VENTRICULAR POSTERIOR WALL IN END DIASTOLE: 1 CM (ref 0.56–1.04)
LV DIASTOLIC VOLUME: 56.5 CM3
LV ESV (APICAL 2 CHAMBER): 21.9 CM3
LVAD-AP2: 20.7 CM2
LVAD-AP4: 21.3 CM2
LVAS-AP2: 11.3 CM2
LVAS-AP4: 11.4 CM2
LVCI: 1.2 L/MIN/M2
LVCO: 2.2 L/MIN
LVEDVI(A2C): 31.22 CM3/M2
LVEDVI(BP): 33.43 CM3/M2
LVESVI(A2C): 12.1 CM3/M2
LVESVI(BP): 12.15 CM3/M2
LVLD-AP2: 6.59 CM
LVLD-AP4: 6.24 CM
LVLS-AP2: 5.19 CM
LVLS-AP4: 5.56 CM
LVOT 2D: 2.1 CM
LVOT A: 3.46 CM2
LVOT MG: 2 MMHG
LVOT MV: 0.66 M/S
LVOT PEAK VELOCITY: 0.92 M/S
LVOT PG: 3 MMHG
LVOT STROKE VOLUME INDEX: 39.4 ML/M2
LVOT VTI: 20.6 CM
M MODE - LEFT INTERNAL DIMENSION IN SYSTOLE: 2.8 CM
M MODE - LEFT VENTRICULAR INTERNAL DIMENSION IN DIASTOLE: 4.61 CM
MLH CV ECHO AVA INDEX VELOCITY RATIO: 1.2
MV E'TISSUE VEL-LAT: 0.08 M/S
MV E'TISSUE VEL-MED: 0.06 M/S
MV PEAK A VEL: 0.62 M/S
MV PEAK E VEL: 0.64 M/S
MV STENOSIS PRESSURE HALF TIME: 111 MS
MV VALVE AREA P 1/2 METHOD: 1.98 CM2
PI PEAK VELOCITY: 1.41 M/S
POSTERIOR WALL: 1 CM
PULMONARY REGURGITATION LATE DIASTOLIC VELOCITY: 1.16 M/S
PV PEAK GRADIENT: 2 MMHG
PV PV: 0.74 M/S
RAP: 3 MMHG
RIGHT VENTRICULAR LENGTH IN DIASTOLE (APICAL 4-CHAMBER VIEW): 5.3 CM
RV AP4 BASE: 3 CM
RV AP4 MID: 3.3 CM
RVOT VMAX: 0.62 M/S
RVOT VTI: 14.3 CM
SEPTAL TISSUE DOPPLER FREE WALL LATE DIA VELOCITY (APICAL 4 CHAMBER VIEW): 0.16 M/S
TAPSE: 2.3 CM
TR MAX PG: 17.47 MMHG
TRICUSPID VALVE PEAK REGURGITATION VELOCITY: 2.09 M/S
Z-SCORE OF LEFT VENTRICULAR DIMENSION IN END DIASTOLE: -1.29
Z-SCORE OF LEFT VENTRICULAR DIMENSION IN END SYSTOLE: -1.08
Z-SCORE OF LEFT VENTRICULAR POSTERIOR WALL IN END DIASTOLE: 1.43

## 2024-05-07 ENCOUNTER — TELEPHONE (OUTPATIENT)
Dept: CARDIOLOGY | Facility: CLINIC | Age: 69
End: 2024-05-07
Payer: MEDICARE

## 2024-06-06 ENCOUNTER — OFFICE VISIT (OUTPATIENT)
Dept: UROGYNECOLOGY | Facility: CLINIC | Age: 69
End: 2024-06-06
Payer: MEDICARE

## 2024-06-06 VITALS
HEIGHT: 68 IN | DIASTOLIC BLOOD PRESSURE: 76 MMHG | WEIGHT: 149 LBS | SYSTOLIC BLOOD PRESSURE: 115 MMHG | BODY MASS INDEX: 22.58 KG/M2

## 2024-06-06 DIAGNOSIS — Z46.89 ENCOUNTER FOR PESSARY MAINTENANCE: Primary | ICD-10-CM

## 2024-06-06 PROCEDURE — 99459 PELVIC EXAMINATION: CPT | Performed by: OBSTETRICS & GYNECOLOGY

## 2024-06-06 PROCEDURE — G8754 DIAS BP LESS 90: HCPCS | Performed by: OBSTETRICS & GYNECOLOGY

## 2024-06-06 PROCEDURE — 99213 OFFICE O/P EST LOW 20 MIN: CPT | Performed by: OBSTETRICS & GYNECOLOGY

## 2024-06-06 PROCEDURE — G8752 SYS BP LESS 140: HCPCS | Performed by: OBSTETRICS & GYNECOLOGY

## 2024-06-06 RX ORDER — SYRING-NEEDL,DISP,INSUL,0.3 ML 29 G X1/2"
296 SYRINGE, EMPTY DISPOSABLE MISCELLANEOUS ONCE
COMMUNITY
End: 2024-10-24

## 2024-06-06 NOTE — ASSESSMENT & PLAN NOTE
Melisa continues to do well with the pessary. I provided education on how to remove and insert the pessary and encouraged her to consider trying to manage it herself, as she used a diaphragm when she was younger. I asked Melisa to return for a pessary check in 3 months, or sooner if clinically indicated.

## 2024-06-06 NOTE — PROGRESS NOTES
Melisa Wu presents today for follow-up on pessary maintenance.    S: Ms. Wu presents today in scheduled follow-up on pessary maintenance. She uses a #3 ring with membrane and knob.    Today, Melisa reports that she continues to do well with the pessary. She has not removed or replaced it herself, but it considering trying. The pessary slips down every once in a while, but it does not fall out, and she is able to digitally push it back into place. She denies vaginal bleeding or pain. She feels like it is controlling her pelvic floor disorders well.    The following were reviewed today:   Allergies  Meds  Problems         O:   Vitals:    06/06/24 1349   BP: 115/76      Body mass index is 22.66 kg/m².  Gen: This is a well-appearing, well-nourished female in no apparent distress.   Pelvic exam (with a female chaperone present): Normal external genitalia   The pessary was properly positioned within the vagina.  The pessary was removed digitally and washed with soap and water.  Speculum exam: No abrasions or ulcerations seen.  Bimanual exam: No masses or tenderness noted.  The pessary was lubricated and reinserted without difficulty.     Assessment/Plan     Encounter for pessary maintenance  Melisa continues to do well with the pessary. I provided education on how to remove and insert the pessary and encouraged her to consider trying to manage it herself, as she used a diaphragm when she was younger. I asked Melisa to return for a pessary check in 3 months, or sooner if clinically indicated.       Return in about 3 months (around 9/6/2024) for Pessary check.       Hudson Smith MD PhD

## 2024-06-06 NOTE — PATIENT INSTRUCTIONS
If you have any problems or concerns, call our office at [phone].  If you think you are having a medical emergency, please call [phone].    If you have access to [employer] (the online patient portal), results from tests ordered at your visit (such as urine tests or ultrasounds) will appear in your portal as soon as they are ready. Please understand that you may see these results faster than we do. Please do not call about the results immediately - we will review your results and contact you after we have had time to analyze your tests.     Please also understand that we frequently do not receive messages sent to us by patients through [employer]. If you have questions or concerns, please call our office.

## 2024-08-28 RX ORDER — ROSUVASTATIN CALCIUM 10 MG/1
10 TABLET, COATED ORAL DAILY
Qty: 90 TABLET | Refills: 1 | Status: SHIPPED | OUTPATIENT
Start: 2024-08-28 | End: 2025-03-03

## 2024-08-28 NOTE — TELEPHONE ENCOUNTER
Rosuvastatin 10 mg daily script renewed.      [place]:  Can you please obtain most recent lab results from PCP.  Please let me/KH know when scanned in Epic.  Thank you.

## 2024-10-18 ENCOUNTER — TRANSCRIBE ORDERS (OUTPATIENT)
Dept: SCHEDULING | Age: 69
End: 2024-10-18

## 2024-10-18 DIAGNOSIS — M47.816 SPONDYLOSIS WITHOUT MYELOPATHY OR RADICULOPATHY, LUMBAR REGION: ICD-10-CM

## 2024-10-18 DIAGNOSIS — M54.41 LUMBAGO WITH SCIATICA, RIGHT SIDE: Primary | ICD-10-CM

## 2024-10-24 ENCOUNTER — OFFICE VISIT (OUTPATIENT)
Dept: UROGYNECOLOGY | Facility: CLINIC | Age: 69
End: 2024-10-24
Payer: MEDICARE

## 2024-10-24 VITALS
BODY MASS INDEX: 21.82 KG/M2 | HEIGHT: 68 IN | SYSTOLIC BLOOD PRESSURE: 129 MMHG | DIASTOLIC BLOOD PRESSURE: 84 MMHG | WEIGHT: 144 LBS

## 2024-10-24 DIAGNOSIS — Z46.89 ENCOUNTER FOR PESSARY MAINTENANCE: Primary | ICD-10-CM

## 2024-10-24 DIAGNOSIS — R82.998 LEUKOCYTES IN URINE: ICD-10-CM

## 2024-10-24 LAB
BLOOD URINE, POC: POSITIVE
EXPIRATION DATE: ABNORMAL
LEUKOCYTE EST, POC: ABNORMAL
Lab: ABNORMAL
NITRITE, POC: NEGATIVE
POCT MANUFACTURER: ABNORMAL

## 2024-10-24 PROCEDURE — 99459 PELVIC EXAMINATION: CPT | Performed by: OBSTETRICS & GYNECOLOGY

## 2024-10-24 PROCEDURE — G8752 SYS BP LESS 140: HCPCS | Performed by: OBSTETRICS & GYNECOLOGY

## 2024-10-24 PROCEDURE — G8754 DIAS BP LESS 90: HCPCS | Performed by: OBSTETRICS & GYNECOLOGY

## 2024-10-24 PROCEDURE — 81002 URINALYSIS NONAUTO W/O SCOPE: CPT | Performed by: OBSTETRICS & GYNECOLOGY

## 2024-10-24 PROCEDURE — 99214 OFFICE O/P EST MOD 30 MIN: CPT | Performed by: OBSTETRICS & GYNECOLOGY

## 2024-10-24 NOTE — ASSESSMENT & PLAN NOTE
Melisa continues to do well with her pessary. I provided education and reassurance about managing the device on her own - she plans to try to remove the pessary herself on the day of her next appointment. I am otherwise reassured that her vaginal tissues look and feel healthy. I therefore reinserted her pessary and asked her to follow-up in 3 months, or sooner if clinically indicated.

## 2024-10-24 NOTE — PROGRESS NOTES
Melisa Wu presents today for follow-up on pessary maintenance.    S: Ms. Wu presents today in scheduled follow-up on pessary maintenance. She has a #3 ring pessary with membrane and anti-incontinence knob.    She has not removed or replaced the pessary on her own. She denies vaginal bleeding or pain. The pessary keeps her prolapsed reduced. She continues to have mild stress urinary incontinence, but not as high volume of leakage.    She denies dysuria, flank pain, or hematuria.    The following were reviewed today:   Allergies  Meds  Problems         O:   Vitals:    10/24/24 1444   BP: 129/84      Body mass index is 21.9 kg/m².  Gen: This is a well-appearing, well-nourished female in no apparent distress.   Back: No costovertebral angle tenderness bilaterally.  Pelvic exam (with a female chaperone present): Normal external genitalia     Point-of-care urinalysis on a clean-catch specimen: positive heme, positive leukocyte esterase, no nitrites detected     The pessary was properly positioned within the vagina.  The pessary was removed digitally and washed with soap and water.  Speculum exam: No abrasions or ulcerations seen.  Bimanual exam: No masses or tenderness noted.  The pessary was lubricated and reinserted without difficulty.     Assessment/Plan     Encounter for pessary maintenance  Melisa continues to do well with her pessary. I provided education and reassurance about managing the device on her own - she plans to try to remove the pessary herself on the day of her next appointment. I am otherwise reassured that her vaginal tissues look and feel healthy. I therefore reinserted her pessary and asked her to follow-up in 3 months, or sooner if clinically indicated.     Leukocytes in urine  Heme and leukocyte esterase were detected on a point-of-care urinalysis today. However, Melisa is not having any symptoms of a urinary tract infection. I explained that these findings could indicated a urinary  tract infection, but could also be due to contamination from vaginal secretions. I offered a straight catheterization to rule out a urinary tract infection on a catheterized urine specimen, but she declined this today.      Return in about 3 months (around 1/24/2025) for Pessary check.       Hudson Smith MD PhD

## 2024-10-24 NOTE — PATIENT INSTRUCTIONS
If you have any problems or concerns, call our office at [phone].  If you think you are having a medical emergency, please call [phone].    If you have access to [email or web address] (the online patient portal), results from tests ordered at your visit (such as urine tests or ultrasounds) will appear in your portal as soon as they are ready. Please understand that you may see these results faster than we do. Please do not call about the results immediately - we will review your results and contact you after we have had time to analyze your tests.     Please also understand that we frequently do not receive messages sent to us by patients through [email or web address]. If you have questions or concerns, please call our office.

## 2024-10-24 NOTE — ASSESSMENT & PLAN NOTE
Heme and leukocyte esterase were detected on a point-of-care urinalysis today. However, Melisa is not having any symptoms of a urinary tract infection. I explained that these findings could indicated a urinary tract infection, but could also be due to contamination from vaginal secretions. I offered a straight catheterization to rule out a urinary tract infection on a catheterized urine specimen, but she declined this today.

## 2024-10-24 NOTE — PROGRESS NOTES
Ms Wu presents today in scheduled follow-up on pessary maintenance. She uses a #3 ring with membrane and knob   Attempted to remove the pessary this morning and unable to has been able t push back up if pessary moves; Anastacia recent UTI     Denies urgency , vaginal or pelvic pain       Urine dip + heme leukocytes   No other complaints   SAÚL Dinh RN

## 2024-11-01 ENCOUNTER — HOSPITAL ENCOUNTER (OUTPATIENT)
Dept: RADIOLOGY | Age: 69
Discharge: HOME | End: 2024-11-01
Attending: FAMILY MEDICINE
Payer: MEDICARE

## 2024-11-01 DIAGNOSIS — M54.41 LUMBAGO WITH SCIATICA, RIGHT SIDE: ICD-10-CM

## 2024-11-01 DIAGNOSIS — M47.816 SPONDYLOSIS WITHOUT MYELOPATHY OR RADICULOPATHY, LUMBAR REGION: ICD-10-CM

## 2024-11-21 ENCOUNTER — OFFICE VISIT (OUTPATIENT)
Dept: UROGYNECOLOGY | Facility: CLINIC | Age: 69
End: 2024-11-21
Payer: MEDICARE

## 2024-11-21 VITALS
HEIGHT: 68 IN | WEIGHT: 144 LBS | DIASTOLIC BLOOD PRESSURE: 82 MMHG | SYSTOLIC BLOOD PRESSURE: 133 MMHG | BODY MASS INDEX: 21.82 KG/M2

## 2024-11-21 DIAGNOSIS — Z46.89 ENCOUNTER FOR PESSARY MAINTENANCE: Primary | ICD-10-CM

## 2024-11-21 PROCEDURE — 99213 OFFICE O/P EST LOW 20 MIN: CPT | Performed by: OBSTETRICS & GYNECOLOGY

## 2024-11-21 PROCEDURE — G8752 SYS BP LESS 140: HCPCS | Performed by: OBSTETRICS & GYNECOLOGY

## 2024-11-21 PROCEDURE — G8754 DIAS BP LESS 90: HCPCS | Performed by: OBSTETRICS & GYNECOLOGY

## 2024-11-21 PROCEDURE — 99459 PELVIC EXAMINATION: CPT | Performed by: OBSTETRICS & GYNECOLOGY

## 2024-11-21 NOTE — PROGRESS NOTES
Pt here with pessary problem. #3 ring with membrane and anti-incontinence knob.    No bleeding. Feels uncomfortable.

## 2024-11-21 NOTE — ASSESSMENT & PLAN NOTE
I explained to Melisa that the pessary had rotated such that the knob was not properly positioned. However, it feels like a good fit and I am reassured that her vaginal tissues appeared healthy. I therefore reinserted the cleaned pessary and recommended she return in 3 months, or sooner if clinically indicated.

## 2024-11-21 NOTE — PROGRESS NOTES
Melisa Wu presents today for follow-up on a vaginal pessary.    S: Ms. Wu presents today in urgent follow-up on her pessary. She has been using a #3 ring pessary with membrane and anti-incontinence knob. She reports that something felt uncomfortable with her pessary, so she wanted to have it checked. She does not remove the pessary on her own, and can't think of any triggering events, but the pessary just felt uncomfortable like it was not in properly. She denies vaginal bleeding or discharge..    The following were reviewed today:   Allergies  Meds  Problems         O:   Vitals:    11/21/24 1342   BP: 133/82      Body mass index is 21.9 kg/m².  Gen: This is a well-appearing, well-nourished female in no apparent distress.   Pelvic exam (with a female chaperone present): Normal external genitalia   The pessary was properly located in the vagina, but was malpositioned with the knob facing towards the back and her right side.  The pessary was removed digitally and washed with soap and water.  Speculum exam: No abrasions or ulcerations.  Bimanual exam: No masses or abnormal lesions noted.  The pessary was lubricated and reinserted without difficulty.     Assessment/Plan     Encounter for pessary maintenance  I explained to Melisa that the pessary had rotated such that the knob was not properly positioned. However, it feels like a good fit and I am reassured that her vaginal tissues appeared healthy. I therefore reinserted the cleaned pessary and recommended she return in 3 months, or sooner if clinically indicated.       Return in about 3 months (around 2/21/2025) for Pessary check.       Hudson Smith MD PhD

## 2024-11-21 NOTE — PATIENT INSTRUCTIONS
If you have any problems or concerns, call our office at [phone].  If you think you are having a medical emergency, please call [phone].    If you have access to [employer] (the online patient portal), results from tests ordered at your visit (such as urine tests or ultrasounds) will appear in your portal as soon as they are ready. Please understand that you may see these results faster than we do. Please do not call about the results immediately - we will review your results and contact you after we have had time to analyze your tests.     Please also understand that we frequently do not receive messages sent to us by patients through [employer]. If you have questions or concerns, please call our office.

## 2024-12-11 NOTE — ASSESSMENT & PLAN NOTE
From 10/31/2024: Total cholesterol 143, triglycerides 112, HDL 55, LDL 68.  Significantly improved LDL from a peak of 178 5/18/2021.  Estimated 10-year ASCVD risk elevated at 13.9%.  She declines coronary calcium score.  --Continue high intensity statin: Rosuvastatin 10 mg daily.  --Recommend target LDL less than 50-70 given risk factor profile.

## 2024-12-11 NOTE — ASSESSMENT & PLAN NOTE
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  We do not have records regarding chemotherapy, however treatment often begins with intensive remission induction chemotherapy which includes anthracycline.  She underwent stem cell transplant.  She also received total body irradiation with a cumulative dose of 1500, cardiac exposure unknown.  Echocardiogram 5/2/2024 with preserved biventricular systolic function.  --Plan to monitor for late cardiac toxicity with echocardiogram every 5 years.  --Recommend aggressive cardiovascular risk factor optimization.  --Lifestyle modification discussed.  She maintains a healthy weight with heart healthy diet.

## 2024-12-11 NOTE — PROGRESS NOTES
Saritha Nick MD, [facility]  Cardiology    [facility]    [facility]  The [facility] [address]  [address] [place] [address]    TEL  [phone]  [facility]     2024    Reason for visit: Cardiovascular follow-up.    Melisa Wu is a 69 y.o. female who presents for cardiovascular follow-up. Kelin has a past medical history of hypertension, gestational hypertension, premature menopause, and prediabetes.  I last saw her in the office on 2023 at which time she was feeling well.  Blood pressure was acceptable and no medication changes were made.  I did recommend a repeat echocardiogram.    Interval history obtained from the patient and extensive review of all available medical records.  Echocardiogram was completed May 2, 2024 showing preserved biventricular systolic function.  She had updated lipids completed 2024 which we are able to obtain from the primary care physician prior to her visit and outlined below.    Today, Melisa returns for cardiovascular follow-up and reports feeling well.   She has been exercising with weight lifting with a . Back issues have limited yoga. She denies cardiovascular symptom or limitation.    She is without chest pain, shortness of breath, orthopnea, PND, palpitations, dizziness, lightheadedness, or syncope.  She continues amlodipine 2.5 mg daily.      Past Medical History:  1.  Hypertension  2.  Acute myoblastic leukemia:   3.  History of gestational hypertension  4.  Premature menopause  5.  Obstetric history:   6.  Leukopenia  7.  Insomnia    Past Surgical History:  1.  Bone marrow transplant:   2.  Port placement:     Medications: Rosuvastatin 10 mg daily, amlodipine 2.5 mg daily, amitriptyline 25 mg nightly, vitamin B12 100 mcg daily, vitamin D 1000 units daily, biotin, vitamin C.    Allergies: Patient has no known allergies.    Social History:  , Juan Alberto. Former smoker, quit over 40 years ago.  Denies alcohol or illicit drug use.    Family History: Reviewed and noncontributory.    Exam:  Objective   Vitals:    12/12/24 1433   BP: 124/74   Pulse: 61   SpO2: 99%     Body mass index is 22.2 kg/m².  Physical Exam  Constitutional:       General: She is not in acute distress.  Cardiovascular:      Rate and Rhythm: Normal rate and regular rhythm.      Heart sounds: Normal heart sounds. No murmur heard.  Pulmonary:      Effort: Pulmonary effort is normal. No respiratory distress.      Breath sounds: Normal breath sounds.   Musculoskeletal:      Right lower leg: No edema.      Left lower leg: No edema.   Skin:     General: Skin is warm and dry.   Neurological:      Mental Status: She is alert.         Labs:  Lab Results   Component Value Date    WBC 3.83 06/08/2022    HGB 12.6 06/08/2022     06/08/2022    CHOL 147 06/08/2022    TRIG 109 06/08/2022    HDL 55 06/08/2022    ALT 16 06/08/2022    AST 21 06/08/2022     09/14/2022    K 4.7 09/14/2022    CREATININE 1.1 09/14/2022    TSH 2.44 05/18/2021    HGBA1C 5.5 05/18/2021    MICROALBUR 5.0 05/18/2021   From 10/31/2024: Total cholesterol 143, triglycerides 112, HDL 55, LDL 68, sodium 140, potassium 5.3, BUN 18, creatinine 1.07, hemoglobin A1c 5.8%, AST 25, ALT 17.  Personally reviewed, my interpretation: LDL 70.  Normal hemoglobin A1c.  Normal renal and liver function.    Cardiovascular Studies:   1. Echocardiogram 5/2/2024 (personally reviewed): Normal left ventricular cavity size and wall thickness with preserved systolic function.  EF 65%.  No regional wall motion abnormalities.  Normal diastolic function.  Normal right ventricular size and function.  Normal biatrial size.  Thickened trileaflet aortic valve without stenosis or regurgitation.  Thickened mitral Santino.  Mild mitral Gertsch Tatian.  Mild tricuspid regurgitation.  Estimate RVSP 20 mmHg.  Normal IVC.  No pericardial effusion.  2.  Echocardiogram 12/1/2016 (personally reviewed): Normal left ventricular cavity size and wall thickness with preserved systolic function, EF 60%.  No regional wall motion abnormalities.  False tendon.  Normal right ventricular size with preserved systolic function.  Normal biatrial size.  Mitral leaflets are mildly thickened.  Mild posterior annular calcification.  Mild mitral regurgitation.  Trileaflet aortic valve.  No aortic stenosis or regurgitation.  Mild tricuspid regurgitation.  Estimated RVSP 18 mmHg.    EKG from today personally reviewed and discussed with the patient shows sinus rhythm, normal tracing.     Assessment/Plan   Problem List Items Addressed This Visit       Essential hypertension - Primary     Blood pressure acceptable on exam today, measuring 124/74 mmHg.  --Continue amlodipine 2.5 mg daily.  --Goal blood pressure less than 130/80 mmHg.  --Lifestyle modification discussed, particularly regular exercise.         Relevant Orders    [facility] MUSE ECG 12 lead (clinic performed) (Completed)    Mixed hyperlipidemia     From 10/31/2024: Total cholesterol 143, triglycerides 112, HDL 55, LDL 68.  Significantly improved LDL from a peak of 178 5/18/2021.  Estimated 10-year ASCVD risk elevated at 13.9%.  She declines coronary calcium score.  --Continue high intensity statin: Rosuvastatin 10 mg daily.  --Recommend target LDL less than 50-70 given risk factor profile.         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  We do not have records regarding chemotherapy, however treatment often begins with intensive remission induction chemotherapy which includes anthracycline.  She underwent stem cell transplant.  She also received total body irradiation with a cumulative dose of 1500, cardiac exposure unknown.  Echocardiogram 5/2/2024 with preserved biventricular systolic  function.  --Plan to monitor for late cardiac toxicity with echocardiogram every 5 years.  --Recommend aggressive cardiovascular risk factor optimization.  --Lifestyle modification discussed.  She maintains a healthy weight with heart healthy diet.          Relevant Orders    [facility] MUSE ECG 12 lead (clinic performed) (Completed)            This letter was generated using speech recognition software. Please excuse any typographical errors.    Return if symptoms worsen or fail to improve.    She elects to follow regularly with her primary care physician which is very reasonable.  Advised to contact me with any cardiovascular concerns.  I would recommend obtaining an echocardiogram every 5 years given her previous chemotherapy exposure.  I am happy to see her any time if needed.      Saritha Nick MD, FACC

## 2024-12-12 ENCOUNTER — OFFICE VISIT (OUTPATIENT)
Dept: CARDIOLOGY | Facility: CLINIC | Age: 69
End: 2024-12-12
Payer: MEDICARE

## 2024-12-12 VITALS
OXYGEN SATURATION: 99 % | DIASTOLIC BLOOD PRESSURE: 74 MMHG | WEIGHT: 146 LBS | BODY MASS INDEX: 22.13 KG/M2 | HEIGHT: 68 IN | SYSTOLIC BLOOD PRESSURE: 124 MMHG | HEART RATE: 61 BPM

## 2024-12-12 DIAGNOSIS — E78.2 MIXED HYPERLIPIDEMIA: ICD-10-CM

## 2024-12-12 DIAGNOSIS — Z79.899 ENCOUNTER FOR MONITORING CARDIOTOXIC DRUG THERAPY: ICD-10-CM

## 2024-12-12 DIAGNOSIS — I10 ESSENTIAL HYPERTENSION: Primary | ICD-10-CM

## 2024-12-12 DIAGNOSIS — Z51.81 ENCOUNTER FOR MONITORING CARDIOTOXIC DRUG THERAPY: ICD-10-CM

## 2024-12-12 LAB
ATRIAL RATE: 61
P AXIS: 73
PR INTERVAL: 130
QRS DURATION: 100
QT INTERVAL: 424
QTC CALCULATION(BAZETT): 426
R AXIS: 55
T WAVE AXIS: 71
VENTRICULAR RATE: 61

## 2024-12-12 PROCEDURE — 99214 OFFICE O/P EST MOD 30 MIN: CPT | Performed by: INTERNAL MEDICINE

## 2024-12-12 PROCEDURE — G8754 DIAS BP LESS 90: HCPCS | Performed by: INTERNAL MEDICINE

## 2024-12-12 PROCEDURE — 93000 ELECTROCARDIOGRAM COMPLETE: CPT | Performed by: INTERNAL MEDICINE

## 2024-12-12 PROCEDURE — G8752 SYS BP LESS 140: HCPCS | Performed by: INTERNAL MEDICINE

## 2024-12-12 RX ORDER — LANOLIN ALCOHOL/MO/W.PET/CERES
400 CREAM (GRAM) TOPICAL DAILY
COMMUNITY

## 2024-12-12 NOTE — LETTER
December 12, 2024     Rocky Park MD  [address]  [address], [facility]  [place] [address]    Patient: Melisa Wu  YOB: 1955  Date of Visit: 12/12/2024      Dear Dr. Park:    Thank you for referring Melisa Wu to me for evaluation. Below are my notes for this consultation.    If you have questions, please do not hesitate to call me. I look forward to following your patient along with you.         Sincerely,        Saritha Nick MD        CC: No Recipients    Saritha Nick MD  12/12/2024  2:54 PM  Sign when Signing Visit   Saritha Nick MD, [facility]  Cardiology    [facility]  The Heart [facility]  [address] [place] [address]    TEL  [phone]  [facility]     December 12, 2024    Reason for visit: Cardiovascular follow-up.    Melisa Wu is a 69 y.o. female who presents for cardiovascular follow-up. Kelin has a past medical history of hypertension, gestational hypertension, premature menopause, and prediabetes.  I last saw her in the office on December 14, 2023 at which time she was feeling well.  Blood pressure was acceptable and no medication changes were made.  I did recommend a repeat echocardiogram.    Interval history obtained from the patient and extensive review of all available medical records.  Echocardiogram was completed May 2, 2024 showing preserved biventricular systolic function.  She had updated lipids completed October 31, 2024 which we are able to obtain from the primary care physician prior to her visit and outlined below.    Today, Melisa returns for cardiovascular follow-up and reports feeling well.   She has been exercising with weight lifting with a . Back issues have limited yoga. She denies cardiovascular symptom or limitation.    She is without chest pain, shortness of breath, orthopnea, PND, palpitations, dizziness, lightheadedness, or  syncope.  She continues amlodipine 2.5 mg daily.      Past Medical History:  1.  Hypertension  2.  Acute myoblastic leukemia:   3.  History of gestational hypertension  4.  Premature menopause  5.  Obstetric history:   6.  Leukopenia  7.  Insomnia    Past Surgical History:  1.  Bone marrow transplant:   2.  Port placement:     Medications: Rosuvastatin 10 mg daily, amlodipine 2.5 mg daily, amitriptyline 25 mg nightly, vitamin B12 100 mcg daily, vitamin D 1000 units daily, biotin, vitamin C.    Allergies: Patient has no known allergies.    Social History: , Juan Alberto. Former smoker, quit over 40 years ago.  Denies alcohol or illicit drug use.    Family History: Reviewed and noncontributory.    Exam:  Objective  Vitals:    24 1433   BP: 124/74   Pulse: 61   SpO2: 99%     Body mass index is 22.2 kg/m².  Physical Exam  Constitutional:       General: She is not in acute distress.  Cardiovascular:      Rate and Rhythm: Normal rate and regular rhythm.      Heart sounds: Normal heart sounds. No murmur heard.  Pulmonary:      Effort: Pulmonary effort is normal. No respiratory distress.      Breath sounds: Normal breath sounds.   Musculoskeletal:      Right lower leg: No edema.      Left lower leg: No edema.   Skin:     General: Skin is warm and dry.   Neurological:      Mental Status: She is alert.         Labs:  Lab Results   Component Value Date    WBC 3.83 2022    HGB 12.6 2022     2022    CHOL 147 2022    TRIG 109 2022    HDL 55 2022    ALT 16 2022    AST 21 2022     2022    K 4.7 2022    CREATININE 1.1 2022    TSH 2.44 2021    HGBA1C 5.5 2021    MICROALBUR 5.0 2021   From 10/31/2024: Total cholesterol 143, triglycerides 112, HDL 55, LDL 68, sodium 140, potassium 5.3, BUN 18, creatinine 1.07, hemoglobin A1c 5.8%, AST 25, ALT 17.  Personally reviewed, my interpretation: LDL 70.  Normal hemoglobin  A1c.  Normal renal and liver function.    Cardiovascular Studies:   1. Echocardiogram 5/2/2024 (personally reviewed): Normal left ventricular cavity size and wall thickness with preserved systolic function.  EF 65%.  No regional wall motion abnormalities.  Normal diastolic function.  Normal right ventricular size and function.  Normal biatrial size.  Thickened trileaflet aortic valve without stenosis or regurgitation.  Thickened mitral Santino.  Mild mitral Gertsch Tatian.  Mild tricuspid regurgitation.  Estimate RVSP 20 mmHg.  Normal IVC.  No pericardial effusion.  2. Echocardiogram 12/1/2016 (personally reviewed): Normal left ventricular cavity size and wall thickness with preserved systolic function, EF 60%.  No regional wall motion abnormalities.  False tendon.  Normal right ventricular size with preserved systolic function.  Normal biatrial size.  Mitral leaflets are mildly thickened.  Mild posterior annular calcification.  Mild mitral regurgitation.  Trileaflet aortic valve.  No aortic stenosis or regurgitation.  Mild tricuspid regurgitation.  Estimated RVSP 18 mmHg.    EKG from today personally reviewed and discussed with the patient shows sinus rhythm, normal tracing.     Assessment/Plan  Problem List Items Addressed This Visit       Essential hypertension - Primary     Blood pressure acceptable on exam today, measuring 124/74 mmHg.  --Continue amlodipine 2.5 mg daily.  --Goal blood pressure less than 130/80 mmHg.  --Lifestyle modification discussed, particularly regular exercise.         Relevant Orders    [facility] MUSE ECG 12 lead (clinic performed) (Completed)    Mixed hyperlipidemia     From 10/31/2024: Total cholesterol 143, triglycerides 112, HDL 55, LDL 68.  Significantly improved LDL from a peak of 178 5/18/2021.  Estimated 10-year ASCVD risk elevated at 13.9%.  She declines coronary calcium score.  --Continue high intensity statin: Rosuvastatin 10 mg daily.  --Recommend target LDL less than 50-70  given risk factor profile.         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  We do not have records regarding chemotherapy, however treatment often begins with intensive remission induction chemotherapy which includes anthracycline.  She underwent stem cell transplant.  She also received total body irradiation with a cumulative dose of 1500, cardiac exposure unknown.  Echocardiogram 5/2/2024 with preserved biventricular systolic function.  --Plan to monitor for late cardiac toxicity with echocardiogram every 5 years.  --Recommend aggressive cardiovascular risk factor optimization.  --Lifestyle modification discussed.  She maintains a healthy weight with heart healthy diet.          Relevant Orders    [facility] MUSE ECG 12 lead (clinic performed) (Completed)            This letter was generated using speech recognition software. Please excuse any typographical errors.    Return if symptoms worsen or fail to improve.    She elects to follow regularly with her primary care physician which is very reasonable.  Advised to contact me with any cardiovascular concerns.  I would recommend obtaining an echocardiogram every 5 years given her previous chemotherapy exposure.  I am happy to see her any time if needed.      Saritha Nick MD, FACC

## 2024-12-12 NOTE — ASSESSMENT & PLAN NOTE
Blood pressure acceptable on exam today, measuring 124/74 mmHg.  --Continue amlodipine 2.5 mg daily.  --Goal blood pressure less than 130/80 mmHg.  --Lifestyle modification discussed, particularly regular exercise.

## 2025-01-17 ENCOUNTER — LAB REQUISITION (OUTPATIENT)
Dept: LAB | Facility: HOSPITAL | Age: 70
End: 2025-01-17
Attending: OBSTETRICS & GYNECOLOGY
Payer: MEDICARE

## 2025-01-17 DIAGNOSIS — N39.0 RECURRENT UTI: Primary | ICD-10-CM

## 2025-01-17 DIAGNOSIS — N39.0 URINARY TRACT INFECTION, SITE NOT SPECIFIED: ICD-10-CM

## 2025-01-17 PROCEDURE — 87086 URINE CULTURE/COLONY COUNT: CPT | Performed by: INTERNAL MEDICINE

## 2025-01-18 LAB
BACTERIA UR CULT: NORMAL
BACTERIA UR CULT: NORMAL

## 2025-01-21 ENCOUNTER — LAB REQUISITION (OUTPATIENT)
Dept: LAB | Facility: HOSPITAL | Age: 70
End: 2025-01-21
Attending: INTERNAL MEDICINE
Payer: MEDICARE

## 2025-01-21 DIAGNOSIS — N39.0 URINARY TRACT INFECTION, SITE NOT SPECIFIED: ICD-10-CM

## 2025-01-21 LAB
BILIRUB UR QL STRIP.AUTO: NEGATIVE MG/DL
CLARITY UR REFRACT.AUTO: CLEAR
COLOR UR AUTO: YELLOW
GLUCOSE UR STRIP.AUTO-MCNC: NEGATIVE MG/DL
HGB UR QL STRIP.AUTO: NEGATIVE
KETONES UR STRIP.AUTO-MCNC: NEGATIVE MG/DL
LEUKOCYTE ESTERASE UR QL STRIP.AUTO: 3
NITRITE UR QL STRIP.AUTO: NEGATIVE
PH UR STRIP.AUTO: 6 [PH]
PROT UR QL STRIP.AUTO: ABNORMAL
SP GR UR REFRACT.AUTO: 1.02
UROBILINOGEN UR STRIP-ACNC: 0.2 EU/DL

## 2025-01-21 PROCEDURE — 87186 SC STD MICRODIL/AGAR DIL: CPT | Performed by: INTERNAL MEDICINE

## 2025-01-21 PROCEDURE — 81001 URINALYSIS AUTO W/SCOPE: CPT | Performed by: INTERNAL MEDICINE

## 2025-01-22 LAB
BACTERIA URNS QL MICRO: 1 /HPF
HYALINE CASTS #/AREA URNS LPF: ABNORMAL /LPF
MUCOUS THREADS URNS QL MICRO: ABNORMAL /LPF
RBC #/AREA URNS HPF: ABNORMAL /HPF
SQUAMOUS URNS QL MICRO: ABNORMAL /HPF
WBC #/AREA URNS HPF: ABNORMAL /HPF

## 2025-01-24 LAB
BACTERIA UR CULT: ABNORMAL
BACTERIA UR CULT: ABNORMAL

## 2025-03-03 RX ORDER — ROSUVASTATIN CALCIUM 10 MG/1
10 TABLET, COATED ORAL DAILY
Qty: 90 TABLET | Refills: 1 | Status: SHIPPED | OUTPATIENT
Start: 2025-03-03

## 2025-03-20 ENCOUNTER — OFFICE VISIT (OUTPATIENT)
Dept: UROGYNECOLOGY | Facility: CLINIC | Age: 70
End: 2025-03-20
Payer: MEDICARE

## 2025-03-20 VITALS
WEIGHT: 146 LBS | HEIGHT: 68 IN | DIASTOLIC BLOOD PRESSURE: 80 MMHG | SYSTOLIC BLOOD PRESSURE: 122 MMHG | BODY MASS INDEX: 22.13 KG/M2

## 2025-03-20 DIAGNOSIS — N39.3 STRESS INCONTINENCE: ICD-10-CM

## 2025-03-20 DIAGNOSIS — Z46.89 ENCOUNTER FOR PESSARY MAINTENANCE: Primary | ICD-10-CM

## 2025-03-20 PROCEDURE — 99459 PELVIC EXAMINATION: CPT | Performed by: OBSTETRICS & GYNECOLOGY

## 2025-03-20 PROCEDURE — G8752 SYS BP LESS 140: HCPCS | Performed by: OBSTETRICS & GYNECOLOGY

## 2025-03-20 PROCEDURE — 81002 URINALYSIS NONAUTO W/O SCOPE: CPT | Performed by: OBSTETRICS & GYNECOLOGY

## 2025-03-20 PROCEDURE — G8754 DIAS BP LESS 90: HCPCS | Performed by: OBSTETRICS & GYNECOLOGY

## 2025-03-20 PROCEDURE — 99214 OFFICE O/P EST MOD 30 MIN: CPT | Performed by: OBSTETRICS & GYNECOLOGY

## 2025-03-20 NOTE — ASSESSMENT & PLAN NOTE
Melisa continues to have stress urinary incontinence, but I explained that since the knob from the pessary was not under the bladder and urethra, it is not surprising that it has not been working. I am reassured that her urinalysis was unremarkable and she is not having symptoms of a urinary tract infection. We discussed trying a larger pessary, switching to an incontinence dish pessary, or the possibility of surgical treatment. We agreed to continue with the current pessary and will consider an incontinence dish at her next pessary check visit if her leakage has not improved.

## 2025-03-20 NOTE — ASSESSMENT & PLAN NOTE
I am reassured that Melisa does not have any pessary-related abrasions or ulcerations. We discussed the possibility of trying a #4 ring, but I explained that I am concerned it would be too large and painful. We agreed to continue with her same #3 ring pessary with membrane and knob, but I will also bring incontinence dishes for her next visit and we may consider trying a dish instead of the ring with membrane and knob.

## 2025-03-20 NOTE — PATIENT INSTRUCTIONS
If you have any problems or concerns, call our office at [phone].  If you think you are having a medical emergency, please call [phone].    If you have access to [employer] (the online patient portal), results from tests ordered at your visit (such as urine tests or ultrasounds) will appear in your portal as soon as they are ready. Please understand that you may see these results faster than we do. Please do not call about the results immediately - we will review your results and contact you after we have had time to analyze your tests.     Please also understand that we frequently do not receive messages sent to us by patients through [employer]. If you have questions or concerns, please call our office.

## 2025-03-20 NOTE — PROGRESS NOTES
Melisa Wu presents today for follow-up on pessary maintenance.    S: Ms. Wu presents today in scheduled follow-up on uterovaginal prolapse and urinary incontinence . She uses a #3 ring pessary with membrane and knob for conservative management of her pelvic floor disorders.    Melisa reports that she has been having more urinary incontinence recently. She denies dysuria. She does not remove or insert the pessary. She has not had pain or bleeding. The pessary has not slipped or fallen out.    The following were reviewed today:   Allergies  Meds  Problems         O:   Vitals:    03/20/25 1350   BP: 122/80      Body mass index is 22.2 kg/m².  Gen: This is a well-appearing, well-nourished female in no apparent distress.   Back: No costovertebral angle tenderness bilaterally.    Pelvic exam (with a female chaperone present): Normal external genitalia     Point-of-care urinalysis on a clean-catch specimen: no heme, no leukocyte esterase, no nitrites detected     The pessary was found to be malpositioned, with the knob facing the uterus/cervix rather than in the front.  The pessary was removed digitally and washed with soap and water.  Speculum exam: No abrasions or ulcerations seen.  Bimanual exam: No masses or tenderness noted.  The pessary was lubricated and reinserted without difficulty.     Assessment/Plan     Encounter for pessary maintenance  I am reassured that Melisa does not have any pessary-related abrasions or ulcerations. We discussed the possibility of trying a #4 ring, but I explained that I am concerned it would be too large and painful. We agreed to continue with her same #3 ring pessary with membrane and knob, but I will also bring incontinence dishes for her next visit and we may consider trying a dish instead of the ring with membrane and knob.    Stress incontinence  Melisa continues to have stress urinary incontinence, but I explained that since the knob from the pessary was not under  the bladder and urethra, it is not surprising that it has not been working. I am reassured that her urinalysis was unremarkable and she is not having symptoms of a urinary tract infection. We discussed trying a larger pessary, switching to an incontinence dish pessary, or the possibility of surgical treatment. We agreed to continue with the current pessary and will consider an incontinence dish at her next pessary check visit if her leakage has not improved.      Return in about 3 months (around 6/20/2025) for Pessary check.       Hudson Smith MD PhD

## 2025-03-25 ENCOUNTER — PROCEDURE VISIT (OUTPATIENT)
Dept: OTOLARYNGOLOGY | Facility: CLINIC | Age: 70
End: 2025-03-25
Payer: MEDICARE

## 2025-03-25 ENCOUNTER — OFFICE VISIT (OUTPATIENT)
Dept: OTOLARYNGOLOGY | Facility: CLINIC | Age: 70
End: 2025-03-25
Payer: MEDICARE

## 2025-03-25 VITALS
HEIGHT: 68 IN | BODY MASS INDEX: 21.67 KG/M2 | OXYGEN SATURATION: 98 % | HEART RATE: 70 BPM | SYSTOLIC BLOOD PRESSURE: 124 MMHG | WEIGHT: 143 LBS | DIASTOLIC BLOOD PRESSURE: 80 MMHG

## 2025-03-25 DIAGNOSIS — H61.21 IMPACTED CERUMEN OF RIGHT EAR: Primary | ICD-10-CM

## 2025-03-25 DIAGNOSIS — H91.13 PRESBYCUSIS OF BOTH EARS: ICD-10-CM

## 2025-03-25 DIAGNOSIS — M26.623 BILATERAL TEMPOROMANDIBULAR JOINT PAIN: ICD-10-CM

## 2025-03-25 DIAGNOSIS — H90.3 SENSORINEURAL HEARING LOSS (SNHL) OF BOTH EARS: Primary | ICD-10-CM

## 2025-03-25 PROCEDURE — G8752 SYS BP LESS 140: HCPCS | Performed by: PHYSICIAN ASSISTANT

## 2025-03-25 PROCEDURE — G8754 DIAS BP LESS 90: HCPCS | Performed by: PHYSICIAN ASSISTANT

## 2025-03-25 PROCEDURE — 99999 PR OFFICE/OUTPT VISIT,PROCEDURE ONLY: CPT | Performed by: AUDIOLOGIST

## 2025-03-25 PROCEDURE — 99203 OFFICE O/P NEW LOW 30 MIN: CPT | Performed by: PHYSICIAN ASSISTANT

## 2025-03-25 PROCEDURE — 92557 COMPREHENSIVE HEARING TEST: CPT | Performed by: AUDIOLOGIST

## 2025-03-25 PROCEDURE — 92567 TYMPANOMETRY: CPT | Performed by: AUDIOLOGIST

## 2025-03-25 NOTE — LETTER
2025     Rocky Park MD  [address]  [address], [facility]  [place] [address]    Patient: Melisa Wu  YOB: 1955  Date of Visit: 3/25/2025      Dear Dr. Park:    Thank you for referring Melisa Wu to me for evaluation. Below are my notes for this consultation.    If you have questions, please do not hesitate to call me. I look forward to following your patient along with you.         Sincerely,        [place] Enriquez        CC: No Recipients    Chikis Hair PA C  3/25/2025  2:36 PM  Sign when Signing Visit          Otolaryngology - Head and Neck Surgery  [address], [address]  [place] [address]  Phone: [phone]  Fax: [phone]      Patient ID: Melisa Wu                                  : 1955    Visit Date: 3/25/2025    Referring Provider: Rocky Park MD    Reason for Consultation/Chief Complaint  Chief Complaint   Patient presents with    Hearing Loss     History of Present Illness  Melisa Wu is a 69 y.o. female who presents with difficulty hearing for almost 9 years.  She feels the hearing in her right ear is worse than the left. She had difficulty hearing soft voices or high pitches. She avoids noisy restaurants because it is difficult to hear.  She denies frequent ear infections as a child or prior otologic surgeries.  She denies tinnitus, vertigo or dizziness, ear fullness/pressure, postnasal drip or allergy symptoms.      She has TMJ syndrome and grinds her teeth.  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      She has postnasal drip during changes of season and does not use nasal sprays or oral medications.      Review of Systems  Fourteen point ROS is otherwise negative except for those detailed above in the History of Present Illness.    Current Medications  Current Outpatient Medications   Medication Sig Dispense Refill    amLODIPine (NORVASC) 2.5 mg tablet Take 1 tablet (2.5 mg total) by  "mouth daily. 90 tablet 3    magnesium oxide (MAG-OX) 400 mg (241.3 mg magnesium) tablet Take 400 mg by mouth daily.      multivitamin tablet Take 1 tablet by mouth daily.      rosuvastatin (CRESTOR) 10 mg tablet TAKE ONE TABLET BY MOUTH EVERY DAY 90 tablet 1     No current facility-administered medications for this visit.       Past Medical History  Past Medical History:   Diagnosis Date    AML (acute myeloblastic leukemia) (CMS/HCC) 1998    B12 deficiency     Colon polyp 11/2017    Hypertension     Insomnia     Leukopenia     Squamous cell carcinoma     Right arm    Thyroid nodule 07/2013    2mm nodule   Hyperlipidemia    Past Surgical History  Past Surgical History   Procedure Laterality Date    Bone marrow transplant  1998    Portacath placement      subsequently removed       I reviewed the Past Surgical History and I did not make any changes or additions.    Social History  Social History     Tobacco Use    Smoking status: Former    Smokeless tobacco: Never    Tobacco comments:     quit 40+ years ago   Vaping Use    Vaping status: Never Used   Substance Use Topics    Alcohol use: No    Drug use: No       I reviewed the Social History and I did not make any changes or additions.    Family History  Family History   Problem Relation Name Age of Onset    Other Biological Father          Vascular    Breast cancer Maternal Grandmother      Cancer Maternal Grandfather      Colon cancer Paternal Grandmother      Prostate cancer Paternal Grandfather         I reviewed the Family History and I did not make any changes or additions.    Allergies  Patient has no known allergies.    I reviewed the Allergies and I did not make any changes or additions.    Physical Exam  Vitals: Visit Vitals  /80 (BP Location: Right upper arm, Patient Position: Sitting)   Pulse 70   Ht 1.727 m (5' 8\")   Wt 64.9 kg (143 lb) Comment: pt reported   SpO2 98%   BMI 21.74 kg/m²     General:  Well-developed, " well-nourished 69 y.o. female in no acute distress  Voice: Normal without hoarseness, breathiness or stridor.  Head: normocephalic, atraumatic  Face: No scars or lesions. Normal symmetry.  No tenderness upon palpation of bilateral masseter muscles.  Bilateral TMJ subluxation upon opening her mandible.  Eyes:  Extraocular muscles appear grossly intact without signs of conjunctival hemorrhage; sclerae are white  Ears: External ear is normal bilaterally.  External auditory normal without stenosis or exostosis. Right sided cerumen. Tympanic membranes clear, mobile and without retraction or scar.  No fluid in the middle ear space. No signs of middle ear infection.  Nose: No obvious external nasal deformity is appreciated.  Turbinates appear normal in size.  No pus or crusting noted.  Oral Cavity/oropharynx:  Normal tongue movement. Normal gag reflex. No mucosal lesions or masses, leukoplakia, erythroplakia, ulcers or other abnormalities of the tongue, floor of mouth, buccal mucosa, palate or posterior pharyngeal wall are noted.  Neck:  No masses, adenopathy, cervical spasm or thyroid enlargement  Cranial Nerves II through XII: Grossly intact  Lungs: Non-labored breathing      Pertinent radiology and labs reviewed    Audiogram: Mild to severe sensorineural hearing loss in moderate to high frequencies.  Her word recognition score is 96% in the right ear and 92% in the left ear.    Tympanogram: Normal bilaterally    Assessment and Plan  Melisa Wu is a 69 y.o. female with presbycusis, right-sided cerumen impaction, and TMJ syndrome.  I removed her right sided cerumen with a curette.  She tolerated this well.  I reviewed her audiogram.  Hearing amplification is recommended and she will schedule a hearing aid evaluation when she is ready.  We discussed the importance of hearing protection when in prolonged noisy environments.    For TMJ syndrome, I recommended discussing with her dentist regarding obtaining another  mouthguard for bedtime.    She will follow-up in 1 year.    Thank you for the referral and the opportunity to participate in this patient's care.    Sincerely,         Chikis Hair PA-C  Otolaryngology

## 2025-03-25 NOTE — PROGRESS NOTES
Otolaryngology - Head and Neck Surgery  [address], [address]  [place] [address]  Phone: [phone]  Fax: [phone]      Patient ID: Melisa Wu                                  : 1955    Visit Date: 3/25/2025    Referring Provider: Rocky Park MD    Reason for Consultation/Chief Complaint  Chief Complaint   Patient presents with    Hearing Loss     History of Present Illness  Melisa Wu is a 69 y.o. female who presents with difficulty hearing for almost 9 years.  She feels the hearing in her right ear is worse than the left. She had difficulty hearing soft voices or high pitches. She avoids noisy restaurants because it is difficult to hear.  She denies frequent ear infections as a child or prior otologic surgeries.  She denies tinnitus, vertigo or dizziness, ear fullness/pressure, postnasal drip or allergy symptoms.      She has TMJ syndrome and grinds her teeth.  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      She has postnasal drip during changes of season and does not use nasal sprays or oral medications.      Review of Systems  Fourteen point ROS is otherwise negative except for those detailed above in the History of Present Illness.    Current Medications  Current Outpatient Medications   Medication Sig Dispense Refill    amLODIPine (NORVASC) 2.5 mg tablet Take 1 tablet (2.5 mg total) by mouth daily. 90 tablet 3    magnesium oxide (MAG-OX) 400 mg (241.3 mg magnesium) tablet Take 400 mg by mouth daily.      multivitamin tablet Take 1 tablet by mouth daily.      rosuvastatin (CRESTOR) 10 mg tablet TAKE ONE TABLET BY MOUTH EVERY DAY 90 tablet 1     No current facility-administered medications for this visit.       Past Medical History  Past Medical History:   Diagnosis Date    AML (acute myeloblastic leukemia) (CMS/HCC)     B12 deficiency     Colon polyp 2017    Hypertension     Insomnia     Leukopenia     Squamous cell carcinoma     Right arm     "Thyroid nodule 07/2013    2mm nodule   Hyperlipidemia    Past Surgical History  Past Surgical History   Procedure Laterality Date    Bone marrow transplant  1998    Portacath placement      subsequently removed       I reviewed the Past Surgical History and I did not make any changes or additions.    Social History  Social History     Tobacco Use    Smoking status: Former    Smokeless tobacco: Never    Tobacco comments:     quit 40+ years ago   Vaping Use    Vaping status: Never Used   Substance Use Topics    Alcohol use: No    Drug use: No       I reviewed the Social History and I did not make any changes or additions.    Family History  Family History   Problem Relation Name Age of Onset    Other Biological Father          Vascular    Breast cancer Maternal Grandmother      Cancer Maternal Grandfather      Colon cancer Paternal Grandmother      Prostate cancer Paternal Grandfather         I reviewed the Family History and I did not make any changes or additions.    Allergies  Patient has no known allergies.    I reviewed the Allergies and I did not make any changes or additions.    Physical Exam  Vitals: Visit Vitals  /80 (BP Location: Right upper arm, Patient Position: Sitting)   Pulse 70   Ht 1.727 m (5' 8\")   Wt 64.9 kg (143 lb) Comment: pt reported   SpO2 98%   BMI 21.74 kg/m²     General:  Well-developed, well-nourished 69 y.o. female in no acute distress  Voice: Normal without hoarseness, breathiness or stridor.  Head: normocephalic, atraumatic  Face: No scars or lesions. Normal symmetry.  No tenderness upon palpation of bilateral masseter muscles.  Bilateral TMJ subluxation upon opening her mandible.  Eyes:  Extraocular muscles appear grossly intact without signs of conjunctival hemorrhage; sclerae are white  Ears: External ear is normal bilaterally.  External auditory normal without stenosis or exostosis. Right sided cerumen. Tympanic membranes clear, mobile and without retraction or scar.  No fluid " in the middle ear space. No signs of middle ear infection.  Nose: No obvious external nasal deformity is appreciated.  Turbinates appear normal in size.  No pus or crusting noted.  Oral Cavity/oropharynx:  Normal tongue movement. Normal gag reflex. No mucosal lesions or masses, leukoplakia, erythroplakia, ulcers or other abnormalities of the tongue, floor of mouth, buccal mucosa, palate or posterior pharyngeal wall are noted.  Neck:  No masses, adenopathy, cervical spasm or thyroid enlargement  Cranial Nerves II through XII: Grossly intact  Lungs: Non-labored breathing      Pertinent radiology and labs reviewed    Audiogram: Mild to severe sensorineural hearing loss in moderate to high frequencies.  Her word recognition score is 96% in the right ear and 92% in the left ear.    Tympanogram: Normal bilaterally    Assessment and Plan  Melisa Wu is a 69 y.o. female with presbycusis, right-sided cerumen impaction, and TMJ syndrome.  I removed her right sided cerumen with a curette.  She tolerated this well.  I reviewed her audiogram.  Hearing amplification is recommended and she will schedule a hearing aid evaluation when she is ready.  We discussed the importance of hearing protection when in prolonged noisy environments.    For TMJ syndrome, I recommended discussing with her dentist regarding obtaining another mouthguard for bedtime.    She will follow-up in 1 year.    Thank you for the referral and the opportunity to participate in this patient's care.    Sincerely,         Chikis Hair PA-C  Otolaryngology

## 2025-03-25 NOTE — PATIENT INSTRUCTIONS
THE IMPACTS OF AGE-RELATED HEARING LOSS   Communication: Untreated hearing loss limits what you can hear and understand. This can affect relationships at home, work, and in healthcare settings. Improved hearing can improve communication with your family, friends, coworkers, as well as health care providers.     Safety: Untreated hearing loss may increase the risk of safety issues. Hearing is essential for situational awareness and can affect your ability to hear and understand potential safety warnings. Studies show that hearing loss is linked to increased work-related injuries. Improved hearing increases your awareness of your surroundings and your ability to hear alerts and warnings.     Function and Health: Untreated hearing loss is associated with increased balance problems, falls, and other issues. It also can lower your ability to complete daily activities around the house or at work. Improving your hearing can alleviate these concerns and enhance your day-to-day functioning. Cognition Studies show a clear association between untreated age-related hearing loss and cognitive decline. This includes your ability to think, learn, remember, and solve problems. Hearing loss may impact memory loss, difficulty following conversations/directions, or losing your thoughts. Studies show that the use of properly fitted hearing aids may reduce the risk of developing dementia and can reduce cognitive decline.     Quality of Life:  Improving hearing loss can lower the risk of depression and social isolation, improving overall quality of life.     If you notice your hearing loss worsen, please contact the office to schedule an appointment.  If hearing aids are recommended and when you are ready, please make a hearing aid evaluation appointment with one of our audiologist in the [place] office.  ([address], [place] [address]; office number: [phone])

## 2025-05-30 ENCOUNTER — TRANSCRIBE ORDERS (OUTPATIENT)
Dept: RADIOLOGY | Facility: HOSPITAL | Age: 70
End: 2025-05-30

## 2025-05-30 ENCOUNTER — HOSPITAL ENCOUNTER (OUTPATIENT)
Dept: RADIOLOGY | Facility: HOSPITAL | Age: 70
Discharge: HOME | End: 2025-05-30
Attending: INTERNAL MEDICINE
Payer: MEDICARE

## 2025-05-30 DIAGNOSIS — M25.551 RIGHT HIP PAIN: Primary | ICD-10-CM

## 2025-05-30 DIAGNOSIS — M25.551 RIGHT HIP PAIN: ICD-10-CM

## 2025-05-30 PROCEDURE — 73502 X-RAY EXAM HIP UNI 2-3 VIEWS: CPT | Mod: RT

## 2025-06-04 ENCOUNTER — TRANSCRIBE ORDERS (OUTPATIENT)
Dept: SCHEDULING | Age: 70
End: 2025-06-04

## 2025-06-04 DIAGNOSIS — M25.551 RIGHT HIP PAIN: Primary | ICD-10-CM

## 2025-06-05 ENCOUNTER — OFFICE VISIT (OUTPATIENT)
Dept: UROGYNECOLOGY | Facility: CLINIC | Age: 70
End: 2025-06-05
Payer: MEDICARE

## 2025-06-05 VITALS
BODY MASS INDEX: 21.67 KG/M2 | HEIGHT: 68 IN | SYSTOLIC BLOOD PRESSURE: 122 MMHG | DIASTOLIC BLOOD PRESSURE: 80 MMHG | WEIGHT: 143 LBS

## 2025-06-05 DIAGNOSIS — R10.2 PELVIC PAIN IN FEMALE: Primary | ICD-10-CM

## 2025-06-05 PROCEDURE — G8752 SYS BP LESS 140: HCPCS | Performed by: OBSTETRICS & GYNECOLOGY

## 2025-06-05 PROCEDURE — G8754 DIAS BP LESS 90: HCPCS | Performed by: OBSTETRICS & GYNECOLOGY

## 2025-06-05 PROCEDURE — 99459 PELVIC EXAMINATION: CPT | Performed by: OBSTETRICS & GYNECOLOGY

## 2025-06-05 PROCEDURE — 99213 OFFICE O/P EST LOW 20 MIN: CPT | Performed by: OBSTETRICS & GYNECOLOGY

## 2025-06-05 NOTE — PROGRESS NOTES
"Melisa LYNETTE Wu presents today for follow-up on pelvic pain.    S: Ms. Wu presents today in urgent follow-up on acute-onset pelvic pain. She has a #3 ring pessary with membrane and knob, and 5-6 days ago could feel that the pessary had rotated. She normally does not remove or insert the pessary as she is afraid she would be able to get the pessary out but not put it in. Since it felt like the pessary had rotated, she digitally rotated it back into the proper position. She had a little bit of bleeding right afterward, but no bleeding since. However, she started noting a pain in the vagina \"like I'm sitting on metal or something.\" She has noted similar pain in the past after manipulating the pessary, but this time the pain has not subsided, so she came for an evaluation. She is otherwise not having any urinary incontinence or vaginal discharge. She is moving her bowels regularly, with her last bowel movement yesterday.    The following were reviewed today:   Allergies  Meds  Problems         O:   Vitals:    06/05/25 1454   BP: 122/80      Body mass index is 21.74 kg/m².  Gen: This is a well-appearing, well-nourished female in no apparent distress.  Pelvic exam (with a female chaperone present): Normal external genitalia   Stress urinary incontinence was not detected with Valsalva.   Single digit exam: The pessary was properly positioned in the vagina with the knob facing the vaginal opening. There was minimal descent/movement of the pessary with Valsalva.  The pessary was removed digitally and washed with soap and water.  Speculum exam: No abrasions or abnormal lesions seen.  Bimanual exam: Firm stool was palpable transvaginally in the rectal vault. There were no masses or regions of tenderness noted.  The pessary was lubricated and reinserted without difficulty. She noted that the pessary felt comfortable once it was in place.     Assessment/Plan     Pelvic pain in female  I explained to Melisa that I do not " know the etiology of her recent pain. I am reassured that the pessary was properly positioned and did not feel like it was too big or small. Furthermore, her vaginal tissues appeared healthy without lesions that might result in her pain, and there were no palpable masses or tender areas reproducing her pain. We discussed the possibility of leaving the pessary out, but she is traveling to [facility] next week and doesn't want to be without the pessary. I therefore reinserted her pessary and she expressed an understanding that she should call or return if the pain recurs/persists.      Return in about 3 months (around 9/5/2025) for Pessary check.       Hudson Smith MD PhD

## 2025-06-05 NOTE — ASSESSMENT & PLAN NOTE
I explained to Melisa that I do not know the etiology of her recent pain. I am reassured that the pessary was properly positioned and did not feel like it was too big or small. Furthermore, her vaginal tissues appeared healthy without lesions that might result in her pain, and there were no palpable masses or tender areas reproducing her pain. We discussed the possibility of leaving the pessary out, but she is traveling to [place] next week and doesn't want to be without the pessary. I therefore reinserted her pessary and she expressed an understanding that she should call or return if the pain recurs/persists.

## 2025-06-05 NOTE — PATIENT INSTRUCTIONS
If you have any problems or concerns, call our office at [phone].  If you think you are having a medical emergency, please call [phone].    If you have access to [facility] (the online patient portal), results from tests ordered at your visit (such as urine tests or ultrasounds) will appear in your portal as soon as they are ready. Please understand that you may see these results faster than we do. Please do not call about the results immediately - we will review your results and contact you after we have had time to analyze your tests.     Please also understand that we frequently do not receive messages sent to us by patients through [facility]. If you have questions or concerns, please call our office.

## 2025-07-10 ENCOUNTER — LAB REQUISITION (OUTPATIENT)
Dept: LAB | Facility: HOSPITAL | Age: 70
End: 2025-07-10
Attending: INTERNAL MEDICINE
Payer: MEDICARE

## 2025-07-10 DIAGNOSIS — R30.0 DYSURIA: ICD-10-CM

## 2025-07-10 LAB
BACTERIA URNS QL MICRO: ABNORMAL /HPF
BILIRUB UR QL STRIP.AUTO: NEGATIVE MG/DL
CLARITY UR REFRACT.AUTO: ABNORMAL
COLOR UR AUTO: YELLOW
GLUCOSE UR STRIP.AUTO-MCNC: NEGATIVE MG/DL
HGB UR QL STRIP.AUTO: 2
HYALINE CASTS #/AREA URNS LPF: ABNORMAL /LPF
KETONES UR STRIP.AUTO-MCNC: NEGATIVE MG/DL
LEUKOCYTE ESTERASE UR QL STRIP.AUTO: 3
MUCOUS THREADS URNS QL MICRO: ABNORMAL /LPF
NITRITE UR QL STRIP.AUTO: NEGATIVE
PH UR STRIP.AUTO: 6 [PH]
PROT UR QL STRIP.AUTO: ABNORMAL
RBC #/AREA URNS HPF: ABNORMAL /HPF
SP GR UR REFRACT.AUTO: 1.02
SQUAMOUS URNS QL MICRO: ABNORMAL /HPF
UROBILINOGEN UR STRIP-ACNC: 0.2 EU/DL
WBC #/AREA URNS HPF: ABNORMAL /HPF

## 2025-07-10 PROCEDURE — 81003 URINALYSIS AUTO W/O SCOPE: CPT | Performed by: INTERNAL MEDICINE

## 2025-07-10 PROCEDURE — 87086 URINE CULTURE/COLONY COUNT: CPT | Performed by: INTERNAL MEDICINE

## 2025-07-10 PROCEDURE — 87077 CULTURE AEROBIC IDENTIFY: CPT | Performed by: INTERNAL MEDICINE

## 2025-07-12 LAB
BACTERIA UR CULT: ABNORMAL
BACTERIA UR CULT: ABNORMAL

## 2025-09-04 ENCOUNTER — OFFICE VISIT (OUTPATIENT)
Dept: UROGYNECOLOGY | Facility: CLINIC | Age: 70
End: 2025-09-04
Payer: MEDICARE

## 2025-09-04 VITALS
HEIGHT: 68 IN | BODY MASS INDEX: 21.67 KG/M2 | DIASTOLIC BLOOD PRESSURE: 80 MMHG | WEIGHT: 143 LBS | SYSTOLIC BLOOD PRESSURE: 120 MMHG

## 2025-09-04 DIAGNOSIS — Z46.89 ENCOUNTER FOR PESSARY MAINTENANCE: Primary | ICD-10-CM

## 2025-09-04 PROCEDURE — 99213 OFFICE O/P EST LOW 20 MIN: CPT | Performed by: OBSTETRICS & GYNECOLOGY

## 2025-09-04 PROCEDURE — G8752 SYS BP LESS 140: HCPCS | Performed by: OBSTETRICS & GYNECOLOGY

## 2025-09-04 PROCEDURE — 99459 PELVIC EXAMINATION: CPT | Performed by: OBSTETRICS & GYNECOLOGY

## 2025-09-04 PROCEDURE — G8754 DIAS BP LESS 90: HCPCS | Performed by: OBSTETRICS & GYNECOLOGY
